# Patient Record
Sex: MALE | Race: WHITE | NOT HISPANIC OR LATINO | Employment: FULL TIME | URBAN - METROPOLITAN AREA
[De-identification: names, ages, dates, MRNs, and addresses within clinical notes are randomized per-mention and may not be internally consistent; named-entity substitution may affect disease eponyms.]

---

## 2020-02-07 ENCOUNTER — APPOINTMENT (OUTPATIENT)
Dept: GENERAL RADIOLOGY | Facility: HOSPITAL | Age: 65
End: 2020-02-07

## 2020-02-07 ENCOUNTER — TRANSCRIBE ORDERS (OUTPATIENT)
Dept: ADMINISTRATIVE | Facility: HOSPITAL | Age: 65
End: 2020-02-07

## 2020-02-07 ENCOUNTER — HOSPITAL ENCOUNTER (OUTPATIENT)
Facility: HOSPITAL | Age: 65
Setting detail: OBSERVATION
Discharge: HOME | End: 2020-02-09
Attending: EMERGENCY MEDICINE | Admitting: INTERNAL MEDICINE

## 2020-02-07 ENCOUNTER — HOSPITAL ENCOUNTER (OUTPATIENT)
Dept: GENERAL RADIOLOGY | Facility: HOSPITAL | Age: 65
Discharge: HOME | End: 2020-02-07
Admitting: FAMILY MEDICINE

## 2020-02-07 ENCOUNTER — LAB REQUISITION (OUTPATIENT)
Dept: LAB | Facility: HOSPITAL | Age: 65
End: 2020-02-07

## 2020-02-07 DIAGNOSIS — I50.9 NEW ONSET OF CONGESTIVE HEART FAILURE (HCC): Primary | ICD-10-CM

## 2020-02-07 DIAGNOSIS — R60.9 PERIPHERAL EDEMA: ICD-10-CM

## 2020-02-07 DIAGNOSIS — Z00.00 ROUTINE GENERAL MEDICAL EXAMINATION AT A HEALTH CARE FACILITY: ICD-10-CM

## 2020-02-07 DIAGNOSIS — R60.9 EDEMA, UNSPECIFIED: ICD-10-CM

## 2020-02-07 DIAGNOSIS — I50.21 ACUTE SYSTOLIC CONGESTIVE HEART FAILURE (HCC): ICD-10-CM

## 2020-02-07 DIAGNOSIS — R60.9 PERIPHERAL EDEMA: Primary | ICD-10-CM

## 2020-02-07 DIAGNOSIS — R06.02 SHORTNESS OF BREATH: ICD-10-CM

## 2020-02-07 DIAGNOSIS — R60.0 BILATERAL LOWER EXTREMITY EDEMA: ICD-10-CM

## 2020-02-07 PROBLEM — I10 HTN (HYPERTENSION): Status: ACTIVE | Noted: 2020-02-07

## 2020-02-07 PROBLEM — I48.91 A-FIB (HCC): Status: ACTIVE | Noted: 2020-02-07

## 2020-02-07 PROBLEM — F32.A DEPRESSION: Status: ACTIVE | Noted: 2020-02-07

## 2020-02-07 LAB
ALBUMIN SERPL-MCNC: 3.9 G/DL (ref 3.5–5.2)
ALBUMIN/GLOB SERPL: 1.7 G/DL
ALP SERPL-CCNC: 74 U/L (ref 39–117)
ALT SERPL W P-5'-P-CCNC: 80 U/L (ref 1–41)
ANION GAP SERPL CALCULATED.3IONS-SCNC: 13 MMOL/L (ref 5–15)
AST SERPL-CCNC: 49 U/L (ref 1–40)
BASOPHILS # BLD AUTO: 0.06 10*3/MM3 (ref 0–0.2)
BASOPHILS NFR BLD AUTO: 0.9 % (ref 0–1.5)
BILIRUB SERPL-MCNC: 0.4 MG/DL (ref 0.2–1.2)
BUN BLD-MCNC: 13 MG/DL (ref 8–23)
BUN/CREAT SERPL: 12.6 (ref 7–25)
CALCIUM SPEC-SCNC: 9.5 MG/DL (ref 8.6–10.5)
CHLORIDE SERPL-SCNC: 101 MMOL/L (ref 98–107)
CO2 SERPL-SCNC: 25 MMOL/L (ref 22–29)
CREAT BLD-MCNC: 1.03 MG/DL (ref 0.76–1.27)
DEPRECATED RDW RBC AUTO: 54.8 FL (ref 37–54)
EOSINOPHIL # BLD AUTO: 0.05 10*3/MM3 (ref 0–0.4)
EOSINOPHIL NFR BLD AUTO: 0.8 % (ref 0.3–6.2)
ERYTHROCYTE [DISTWIDTH] IN BLOOD BY AUTOMATED COUNT: 14.6 % (ref 12.3–15.4)
GFR SERPL CREATININE-BSD FRML MDRD: 73 ML/MIN/1.73
GLOBULIN UR ELPH-MCNC: 2.3 GM/DL
GLUCOSE BLD-MCNC: 86 MG/DL (ref 65–99)
HCT VFR BLD AUTO: 45.7 % (ref 37.5–51)
HGB BLD-MCNC: 15.1 G/DL (ref 13–17.7)
HOLD SPECIMEN: NORMAL
HOLD SPECIMEN: NORMAL
IMM GRANULOCYTES # BLD AUTO: 0.02 10*3/MM3 (ref 0–0.05)
IMM GRANULOCYTES NFR BLD AUTO: 0.3 % (ref 0–0.5)
LYMPHOCYTES # BLD AUTO: 0.94 10*3/MM3 (ref 0.7–3.1)
LYMPHOCYTES NFR BLD AUTO: 14.2 % (ref 19.6–45.3)
MCH RBC QN AUTO: 33.9 PG (ref 26.6–33)
MCHC RBC AUTO-ENTMCNC: 33 G/DL (ref 31.5–35.7)
MCV RBC AUTO: 102.7 FL (ref 79–97)
MONOCYTES # BLD AUTO: 0.81 10*3/MM3 (ref 0.1–0.9)
MONOCYTES NFR BLD AUTO: 12.2 % (ref 5–12)
NEUTROPHILS # BLD AUTO: 4.76 10*3/MM3 (ref 1.7–7)
NEUTROPHILS NFR BLD AUTO: 71.6 % (ref 42.7–76)
NRBC BLD AUTO-RTO: 0 /100 WBC (ref 0–0.2)
NT-PROBNP SERPL-MCNC: 7347 PG/ML (ref 5–900)
NT-PROBNP SERPL-MCNC: 7470 PG/ML (ref 5–900)
PLATELET # BLD AUTO: 206 10*3/MM3 (ref 140–450)
PMV BLD AUTO: 10.9 FL (ref 6–12)
POTASSIUM BLD-SCNC: 3.5 MMOL/L (ref 3.5–5.2)
PROT SERPL-MCNC: 6.2 G/DL (ref 6–8.5)
RBC # BLD AUTO: 4.45 10*6/MM3 (ref 4.14–5.8)
SODIUM BLD-SCNC: 139 MMOL/L (ref 136–145)
TROPONIN T SERPL-MCNC: 0.01 NG/ML (ref 0–0.03)
TROPONIN T SERPL-MCNC: <0.01 NG/ML (ref 0–0.03)
WBC NRBC COR # BLD: 6.64 10*3/MM3 (ref 3.4–10.8)
WHOLE BLOOD HOLD SPECIMEN: NORMAL
WHOLE BLOOD HOLD SPECIMEN: NORMAL

## 2020-02-07 PROCEDURE — 25010000002 ENOXAPARIN PER 10 MG: Performed by: NURSE PRACTITIONER

## 2020-02-07 PROCEDURE — 93005 ELECTROCARDIOGRAM TRACING: CPT

## 2020-02-07 PROCEDURE — 96374 THER/PROPH/DIAG INJ IV PUSH: CPT

## 2020-02-07 PROCEDURE — 71046 X-RAY EXAM CHEST 2 VIEWS: CPT

## 2020-02-07 PROCEDURE — 99220 PR INITIAL OBSERVATION CARE/DAY 70 MINUTES: CPT | Performed by: INTERNAL MEDICINE

## 2020-02-07 PROCEDURE — 25010000002 FUROSEMIDE PER 20 MG: Performed by: PHYSICIAN ASSISTANT

## 2020-02-07 PROCEDURE — 83880 ASSAY OF NATRIURETIC PEPTIDE: CPT | Performed by: EMERGENCY MEDICINE

## 2020-02-07 PROCEDURE — 84484 ASSAY OF TROPONIN QUANT: CPT | Performed by: EMERGENCY MEDICINE

## 2020-02-07 PROCEDURE — 85025 COMPLETE CBC W/AUTO DIFF WBC: CPT

## 2020-02-07 PROCEDURE — 99284 EMERGENCY DEPT VISIT MOD MDM: CPT

## 2020-02-07 PROCEDURE — 93005 ELECTROCARDIOGRAM TRACING: CPT | Performed by: EMERGENCY MEDICINE

## 2020-02-07 PROCEDURE — G0378 HOSPITAL OBSERVATION PER HR: HCPCS

## 2020-02-07 PROCEDURE — 84484 ASSAY OF TROPONIN QUANT: CPT | Performed by: NURSE PRACTITIONER

## 2020-02-07 PROCEDURE — 80053 COMPREHEN METABOLIC PANEL: CPT | Performed by: EMERGENCY MEDICINE

## 2020-02-07 PROCEDURE — 96372 THER/PROPH/DIAG INJ SC/IM: CPT

## 2020-02-07 PROCEDURE — 83880 ASSAY OF NATRIURETIC PEPTIDE: CPT

## 2020-02-07 RX ORDER — TRIAMTERENE AND HYDROCHLOROTHIAZIDE 37.5; 25 MG/1; MG/1
1 TABLET ORAL DAILY
COMMUNITY
End: 2020-02-09 | Stop reason: HOSPADM

## 2020-02-07 RX ORDER — TRIAMTERENE AND HYDROCHLOROTHIAZIDE 37.5; 25 MG/1; MG/1
1 TABLET ORAL DAILY
Status: DISCONTINUED | OUTPATIENT
Start: 2020-02-08 | End: 2020-02-08

## 2020-02-07 RX ORDER — DULOXETIN HYDROCHLORIDE 30 MG/1
30 CAPSULE, DELAYED RELEASE ORAL DAILY
Status: DISCONTINUED | OUTPATIENT
Start: 2020-02-08 | End: 2020-02-09 | Stop reason: HOSPADM

## 2020-02-07 RX ORDER — MULTIVIT WITH MINERALS/LUTEIN
250 TABLET ORAL DAILY
COMMUNITY

## 2020-02-07 RX ORDER — MULTIPLE VITAMINS W/ MINERALS TAB 9MG-400MCG
1 TAB ORAL DAILY
Status: DISCONTINUED | OUTPATIENT
Start: 2020-02-08 | End: 2020-02-09 | Stop reason: HOSPADM

## 2020-02-07 RX ORDER — FUROSEMIDE 10 MG/ML
40 INJECTION INTRAMUSCULAR; INTRAVENOUS ONCE
Status: COMPLETED | OUTPATIENT
Start: 2020-02-07 | End: 2020-02-07

## 2020-02-07 RX ORDER — SODIUM CHLORIDE 0.9 % (FLUSH) 0.9 %
10 SYRINGE (ML) INJECTION AS NEEDED
Status: DISCONTINUED | OUTPATIENT
Start: 2020-02-07 | End: 2020-02-09 | Stop reason: HOSPADM

## 2020-02-07 RX ORDER — FUROSEMIDE 10 MG/ML
40 INJECTION INTRAMUSCULAR; INTRAVENOUS ONCE
Status: COMPLETED | OUTPATIENT
Start: 2020-02-08 | End: 2020-02-08

## 2020-02-07 RX ORDER — LISINOPRIL 5 MG/1
5 TABLET ORAL DAILY
COMMUNITY
End: 2020-02-09 | Stop reason: HOSPADM

## 2020-02-07 RX ORDER — ASCORBIC ACID 500 MG
250 TABLET ORAL DAILY
Status: DISCONTINUED | OUTPATIENT
Start: 2020-02-08 | End: 2020-02-09 | Stop reason: HOSPADM

## 2020-02-07 RX ORDER — IBUPROFEN 800 MG/1
800 TABLET ORAL EVERY 6 HOURS PRN
COMMUNITY
End: 2020-02-09 | Stop reason: HOSPADM

## 2020-02-07 RX ORDER — SODIUM CHLORIDE 0.9 % (FLUSH) 0.9 %
10 SYRINGE (ML) INJECTION EVERY 12 HOURS SCHEDULED
Status: DISCONTINUED | OUTPATIENT
Start: 2020-02-07 | End: 2020-02-09 | Stop reason: HOSPADM

## 2020-02-07 RX ORDER — DULOXETIN HYDROCHLORIDE 30 MG/1
30 CAPSULE, DELAYED RELEASE ORAL DAILY
COMMUNITY

## 2020-02-07 RX ORDER — LISINOPRIL 5 MG/1
5 TABLET ORAL DAILY
Status: DISCONTINUED | OUTPATIENT
Start: 2020-02-08 | End: 2020-02-08

## 2020-02-07 RX ADMIN — FUROSEMIDE 40 MG: 10 INJECTION, SOLUTION INTRAMUSCULAR; INTRAVENOUS at 18:11

## 2020-02-07 RX ADMIN — ENOXAPARIN SODIUM 40 MG: 40 INJECTION SUBCUTANEOUS at 22:26

## 2020-02-07 RX ADMIN — SODIUM CHLORIDE, PRESERVATIVE FREE 10 ML: 5 INJECTION INTRAVENOUS at 22:26

## 2020-02-07 NOTE — ED PROVIDER NOTES
"Subjective   Hardeep James is a 64 year old male presenting to the ED today with complaints of lower extremity swelling and shortness of breath.   He reports for the past 2-3 weeks he has been experiencing shortness of breath when working. 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 Then, 4 days ago, he noticed bilateral lower extremities swelling. The swelling has persisted and continued to worsen so he presented to his primary care provider today. While at his primary care provider, his BNP level was check and was elevated over 7000 and he was told to come to the ED for further evaluation. Additional complaints include chest tightness and productive cough.  He reports a history of peripheral neuropathy, however denies a cardiac history.  Specifically, no hx of CHF.. He reports he has smoked cigars for the past 23 years. There are no other acute complaints at this time.  He has had no prior cardiac workups.        History provided by:  Patient  Leg Swelling   Location:  Bilateral lower extremities  Quality:  Swelling  Severity:  Moderate  Onset quality:  Gradual  Timing:  Constant  Progression:  Worsening  Chronicity:  New  Context:  Presented to PCP for leg swelling, elevated BNP detected, told to go to ED for further evaluation  Associated symptoms: chest pain (tightness), cough (improving), shortness of breath and wheezing    Associated symptoms: no abdominal pain, no fever, no headaches, no nausea, no rash and no vomiting    Cough:     Progression:  Improving      Review of Systems   Constitutional: Negative for chills and fever.   Respiratory: Positive for cough (improving), shortness of breath and wheezing.    Cardiovascular: Positive for chest pain (tightness).   Gastrointestinal: Negative for abdominal pain, nausea and vomiting.   Genitourinary: Negative for dysuria.   Musculoskeletal: Negative for back pain.        Positive for " bilateral lower extremity swelling.   Skin: Negative for color change and rash.   Allergic/Immunologic: Negative for immunocompromised state.   Neurological: Negative for light-headedness and headaches.   Hematological: Negative.    Psychiatric/Behavioral: Negative.    All other systems reviewed and are negative.      Past Medical History:   Diagnosis Date   • Depression    • Hypertension    • Peripheral neuropathy        No Known Allergies    History reviewed. No pertinent surgical history.    History reviewed. No pertinent family history.    Social History     Socioeconomic History   • Marital status:      Spouse name: Not on file   • Number of children: Not on file   • Years of education: Not on file   • Highest education level: Not on file   Tobacco Use   • Smoking status: Former Smoker     Last attempt to quit: 2020     Years since quittin.0   Substance and Sexual Activity   • Alcohol use: Yes     Comment: fireball on the weekends   • Drug use: Never   • Sexual activity: Defer         Objective   Physical Exam   Constitutional: He is oriented to person, place, and time. He appears well-developed and well-nourished. No distress.   HENT:   Head: Normocephalic and atraumatic.   Nose: Nose normal.   Eyes: Pupils are equal, round, and reactive to light. Conjunctivae are normal. No scleral icterus.   Neck: Normal range of motion. Neck supple.   Cardiovascular: Normal rate, normal heart sounds and intact distal pulses.   No murmur heard.  Irregular rhythm.   Pulmonary/Chest: Effort normal and breath sounds normal. No respiratory distress.   Abdominal: Soft. There is no tenderness.   Musculoskeletal: Normal range of motion. He exhibits edema.   2+ bilateral lower extremity edema.   Neurological: He is alert and oriented to person, place, and time.   Skin: Skin is warm and dry.   Psychiatric: He has a normal mood and affect. His behavior is normal.   Nursing note and vitals reviewed.      Procedures          ED Course    Pt's proBNP is 7470.  CXR shows cardiomegally with mild pulmonary vascular congestion. Troponin is normal.  EKG shows sinus rhythm with frequent PACs.  At times, he appears to have brief a-fib, but this may actually be a manifestation of the PACs.   Pt was given Lasix 40mg IV.  Given his bilateral LE edema and his shortness of breath, along with an elevated proBNP, I think he warrants admission for echocardiogram and further cardiac evaluation.  Will discuss with hospitalist for admission.   ED Course as of Feb 07 1913 Fri Feb 07, 2020 1904 Paged hospitalist. -HNB    [RP]      ED Course User Index  [RP] Neil Wadeeric SHAHID     Recent Results (from the past 24 hour(s))   proBNP    Collection Time: 02/07/20 12:15 PM   Result Value Ref Range    proBNP 7,347.0 (H) 5.0 - 900.0 pg/mL   Comprehensive Metabolic Panel    Collection Time: 02/07/20  5:06 PM   Result Value Ref Range    Glucose 86 65 - 99 mg/dL    BUN 13 8 - 23 mg/dL    Creatinine 1.03 0.76 - 1.27 mg/dL    Sodium 139 136 - 145 mmol/L    Potassium 3.5 3.5 - 5.2 mmol/L    Chloride 101 98 - 107 mmol/L    CO2 25.0 22.0 - 29.0 mmol/L    Calcium 9.5 8.6 - 10.5 mg/dL    Total Protein 6.2 6.0 - 8.5 g/dL    Albumin 3.90 3.50 - 5.20 g/dL    ALT (SGPT) 80 (H) 1 - 41 U/L    AST (SGOT) 49 (H) 1 - 40 U/L    Alkaline Phosphatase 74 39 - 117 U/L    Total Bilirubin 0.4 0.2 - 1.2 mg/dL    eGFR Non African Amer 73 >60 mL/min/1.73    Globulin 2.3 gm/dL    A/G Ratio 1.7 g/dL    BUN/Creatinine Ratio 12.6 7.0 - 25.0    Anion Gap 13.0 5.0 - 15.0 mmol/L   BNP    Collection Time: 02/07/20  5:06 PM   Result Value Ref Range    proBNP 7,470.0 (H) 5.0 - 900.0 pg/mL   Troponin    Collection Time: 02/07/20  5:06 PM   Result Value Ref Range    Troponin T <0.010 0.000 - 0.030 ng/mL   Light Blue Top    Collection Time: 02/07/20  5:06 PM   Result Value Ref Range    Extra Tube hold for add-on    Green Top (Gel)    Collection Time: 02/07/20  5:06 PM   Result Value Ref Range    Extra  "Tube Hold for add-ons.    Lavender Top    Collection Time: 02/07/20  5:06 PM   Result Value Ref Range    Extra Tube hold for add-on    Gold Top - SST    Collection Time: 02/07/20  5:06 PM   Result Value Ref Range    Extra Tube Hold for add-ons.    CBC Auto Differential    Collection Time: 02/07/20  5:06 PM   Result Value Ref Range    WBC 6.64 3.40 - 10.80 10*3/mm3    RBC 4.45 4.14 - 5.80 10*6/mm3    Hemoglobin 15.1 13.0 - 17.7 g/dL    Hematocrit 45.7 37.5 - 51.0 %    .7 (H) 79.0 - 97.0 fL    MCH 33.9 (H) 26.6 - 33.0 pg    MCHC 33.0 31.5 - 35.7 g/dL    RDW 14.6 12.3 - 15.4 %    RDW-SD 54.8 (H) 37.0 - 54.0 fl    MPV 10.9 6.0 - 12.0 fL    Platelets 206 140 - 450 10*3/mm3    Neutrophil % 71.6 42.7 - 76.0 %    Lymphocyte % 14.2 (L) 19.6 - 45.3 %    Monocyte % 12.2 (H) 5.0 - 12.0 %    Eosinophil % 0.8 0.3 - 6.2 %    Basophil % 0.9 0.0 - 1.5 %    Immature Grans % 0.3 0.0 - 0.5 %    Neutrophils, Absolute 4.76 1.70 - 7.00 10*3/mm3    Lymphocytes, Absolute 0.94 0.70 - 3.10 10*3/mm3    Monocytes, Absolute 0.81 0.10 - 0.90 10*3/mm3    Eosinophils, Absolute 0.05 0.00 - 0.40 10*3/mm3    Basophils, Absolute 0.06 0.00 - 0.20 10*3/mm3    Immature Grans, Absolute 0.02 0.00 - 0.05 10*3/mm3    nRBC 0.0 0.0 - 0.2 /100 WBC     Note: In addition to lab results from this visit, the labs listed above may include labs taken at another facility or during a different encounter within the last 24 hours. Please correlate lab times with ED admission and discharge times for further clarification of the services performed during this visit.    XR Chest 1 View    (Results Pending)     Vitals:    02/07/20 1647 02/07/20 1811 02/07/20 1812   BP: (!) 139/119 117/88 117/88   Pulse: 61 98 102   Resp: 16     Temp: 98.5 °F (36.9 °C)     SpO2: 95%  97%   Weight: 99.8 kg (220 lb)     Height: 185.4 cm (73\")       Medications   sodium chloride 0.9 % flush 10 mL (has no administration in time range)   furosemide (LASIX) injection 40 mg (40 mg Intravenous " Given 2/7/20 1811)     ECG/EMG Results (last 24 hours)     Procedure Component Value Units Date/Time    ECG 12 Lead [[id number]] Collected:  02/07/20 1707     Updated:  02/07/20 1706        ECG 12 Lead                             [place] Coma Scale Score: 15                            MDM    Final diagnoses:   New onset of congestive heart failure (CMS/HCC)   Shortness of breath   Bilateral lower extremity edema       Documentation assistance provided by christie Wade.  Information recorded by the scribe was done at my direction and has been verified and validated by me.     Brenda Wade  02/07/20 1806       Zhen Zeng, PA  02/07/20 1919

## 2020-02-08 ENCOUNTER — APPOINTMENT (OUTPATIENT)
Dept: CARDIOLOGY | Facility: HOSPITAL | Age: 65
End: 2020-02-08

## 2020-02-08 LAB
ANION GAP SERPL CALCULATED.3IONS-SCNC: 14 MMOL/L (ref 5–15)
BH CV ECHO MEAS - AO MAX PG (FULL): 0.7 MMHG
BH CV ECHO MEAS - AO MAX PG: 4.7 MMHG
BH CV ECHO MEAS - AO MEAN PG (FULL): 0.25 MMHG
BH CV ECHO MEAS - AO MEAN PG: 2.1 MMHG
BH CV ECHO MEAS - AO ROOT AREA: 19.4 CM^2
BH CV ECHO MEAS - AO V2 MAX: 108.6 CM/SEC
BH CV ECHO MEAS - AO V2 MEAN: 64.1 CM/SEC
BH CV ECHO MEAS - AO V2 VTI: 14 CM
BH CV ECHO MEAS - AVA(I,A): 4.9 CM^2
BH CV ECHO MEAS - AVA(I,D): 4.9 CM^2
BH CV ECHO MEAS - AVA(V,A): 4.1 CM^2
BH CV ECHO MEAS - AVA(V,D): 4.1 CM^2
BH CV ECHO MEAS - BSA(HAYCOCK): 2.3 M^2
BH CV ECHO MEAS - BSA: 2.3 M^2
BH CV ECHO MEAS - BZI_BMI: 29.4 KILOGRAMS/M^2
BH CV ECHO MEAS - BZI_METRIC_HEIGHT: 185.4 CM
BH CV ECHO MEAS - BZI_METRIC_WEIGHT: 101.2 KG
BH CV ECHO MEAS - EDV(CUBED): 155.5 ML
BH CV ECHO MEAS - EDV(MOD-SP2): 150 ML
BH CV ECHO MEAS - EDV(MOD-SP4): 147 ML
BH CV ECHO MEAS - EDV(TEICH): 139.9 ML
BH CV ECHO MEAS - EF(CUBED): 44.8 %
BH CV ECHO MEAS - EF(MOD-BP): 39 %
BH CV ECHO MEAS - EF(MOD-SP2): 37.3 %
BH CV ECHO MEAS - EF(MOD-SP4): 37.4 %
BH CV ECHO MEAS - EF(TEICH): 37 %
BH CV ECHO MEAS - ESV(CUBED): 85.8 ML
BH CV ECHO MEAS - ESV(MOD-SP2): 94 ML
BH CV ECHO MEAS - ESV(MOD-SP4): 92 ML
BH CV ECHO MEAS - ESV(TEICH): 88.2 ML
BH CV ECHO MEAS - FS: 18 %
BH CV ECHO MEAS - IVS/LVPW: 1.2
BH CV ECHO MEAS - IVSD: 1.5 CM
BH CV ECHO MEAS - LA DIMENSION: 4.8 CM
BH CV ECHO MEAS - LA/AO: 0.97
BH CV ECHO MEAS - LAD MAJOR: 7.5 CM
BH CV ECHO MEAS - LAT PEAK E' VEL: 8.3 CM/SEC
BH CV ECHO MEAS - LATERAL E/E' RATIO: 12
BH CV ECHO MEAS - LV DIASTOLIC VOL/BSA (35-75): 65.2 ML/M^2
BH CV ECHO MEAS - LV MASS(C)D: 311.3 GRAMS
BH CV ECHO MEAS - LV MASS(C)DI: 138.1 GRAMS/M^2
BH CV ECHO MEAS - LV MAX PG: 4 MMHG
BH CV ECHO MEAS - LV MEAN PG: 1.9 MMHG
BH CV ECHO MEAS - LV SYSTOLIC VOL/BSA (12-30): 40.8 ML/M^2
BH CV ECHO MEAS - LV V1 MAX: 100.2 CM/SEC
BH CV ECHO MEAS - LV V1 MEAN: 62.5 CM/SEC
BH CV ECHO MEAS - LV V1 VTI: 15.4 CM
BH CV ECHO MEAS - LVIDD: 5.4 CM
BH CV ECHO MEAS - LVIDS: 4.4 CM
BH CV ECHO MEAS - LVLD AP2: 10.1 CM
BH CV ECHO MEAS - LVLD AP4: 10.8 CM
BH CV ECHO MEAS - LVLS AP2: 9.4 CM
BH CV ECHO MEAS - LVLS AP4: 9.6 CM
BH CV ECHO MEAS - LVOT AREA (M): 4.5 CM^2
BH CV ECHO MEAS - LVOT AREA: 4.5 CM^2
BH CV ECHO MEAS - LVOT DIAM: 2.4 CM
BH CV ECHO MEAS - LVPWD: 1.3 CM
BH CV ECHO MEAS - MED PEAK E' VEL: 4.2 CM/SEC
BH CV ECHO MEAS - MEDIAL E/E' RATIO: 23.6
BH CV ECHO MEAS - MV A MAX VEL: 37.5 CM/SEC
BH CV ECHO MEAS - MV DEC TIME: 0.15 SEC
BH CV ECHO MEAS - MV E MAX VEL: 101.2 CM/SEC
BH CV ECHO MEAS - MV E/A: 2.7
BH CV ECHO MEAS - MV MAX PG: 4.9 MMHG
BH CV ECHO MEAS - MV MEAN PG: 1.5 MMHG
BH CV ECHO MEAS - MV V2 MAX: 110.9 CM/SEC
BH CV ECHO MEAS - MV V2 MEAN: 55.2 CM/SEC
BH CV ECHO MEAS - MV V2 VTI: 28.2 CM
BH CV ECHO MEAS - MVA(VTI): 2.4 CM^2
BH CV ECHO MEAS - PA ACC SLOPE: 619.8 CM/SEC^2
BH CV ECHO MEAS - PA ACC TIME: 0.13 SEC
BH CV ECHO MEAS - PA MAX PG: 1.7 MMHG
BH CV ECHO MEAS - PA PR(ACCEL): 18.8 MMHG
BH CV ECHO MEAS - PA V2 MAX: 65.7 CM/SEC
BH CV ECHO MEAS - RAP SYSTOLE: 8 MMHG
BH CV ECHO MEAS - RVSP: 44 MMHG
BH CV ECHO MEAS - SI(AO): 120.5 ML/M^2
BH CV ECHO MEAS - SI(CUBED): 30.9 ML/M^2
BH CV ECHO MEAS - SI(LVOT): 30.4 ML/M^2
BH CV ECHO MEAS - SI(MOD-SP2): 24.8 ML/M^2
BH CV ECHO MEAS - SI(MOD-SP4): 24.4 ML/M^2
BH CV ECHO MEAS - SI(TEICH): 23 ML/M^2
BH CV ECHO MEAS - SV(AO): 271.6 ML
BH CV ECHO MEAS - SV(CUBED): 69.6 ML
BH CV ECHO MEAS - SV(LVOT): 68.6 ML
BH CV ECHO MEAS - SV(MOD-SP2): 56 ML
BH CV ECHO MEAS - SV(MOD-SP4): 55 ML
BH CV ECHO MEAS - SV(TEICH): 51.7 ML
BH CV ECHO MEAS - TAPSE (>1.6): 2 CM2
BH CV ECHO MEAS - TR MAX PG: 36 MMHG
BH CV ECHO MEAS - TR MAX VEL: 304 CM/SEC
BH CV ECHO MEASUREMENTS AVERAGE E/E' RATIO: 16.19
BH CV VAS BP RIGHT ARM: NORMAL MMHG
BH CV XLRA - RV BASE: 5.2 CM
BH CV XLRA - RV LENGTH: 7.8 CM
BH CV XLRA - RV MID: 3 CM
BH CV XLRA - TDI S': 8.22 CM/SEC
BUN BLD-MCNC: 13 MG/DL (ref 8–23)
BUN/CREAT SERPL: 14.8 (ref 7–25)
CALCIUM SPEC-SCNC: 8.7 MG/DL (ref 8.6–10.5)
CHLORIDE SERPL-SCNC: 100 MMOL/L (ref 98–107)
CHOLEST SERPL-MCNC: 123 MG/DL (ref 0–200)
CO2 SERPL-SCNC: 23 MMOL/L (ref 22–29)
CREAT BLD-MCNC: 0.88 MG/DL (ref 0.76–1.27)
DEPRECATED RDW RBC AUTO: 54.5 FL (ref 37–54)
ERYTHROCYTE [DISTWIDTH] IN BLOOD BY AUTOMATED COUNT: 14.6 % (ref 12.3–15.4)
GFR SERPL CREATININE-BSD FRML MDRD: 88 ML/MIN/1.73
GLUCOSE BLD-MCNC: 110 MG/DL (ref 65–99)
HBA1C MFR BLD: 5.7 % (ref 4.8–5.6)
HCT VFR BLD AUTO: 43 % (ref 37.5–51)
HDLC SERPL-MCNC: 49 MG/DL (ref 40–60)
HGB BLD-MCNC: 14.1 G/DL (ref 13–17.7)
LDLC SERPL CALC-MCNC: 61 MG/DL (ref 0–100)
LDLC/HDLC SERPL: 1.24 {RATIO}
LEFT ATRIUM VOLUME INDEX: 42.2 ML/M^2
LEFT ATRIUM VOLUME: 95 ML
MAXIMAL PREDICTED HEART RATE: 158 BPM
MCH RBC QN AUTO: 33.3 PG (ref 26.6–33)
MCHC RBC AUTO-ENTMCNC: 32.8 G/DL (ref 31.5–35.7)
MCV RBC AUTO: 101.7 FL (ref 79–97)
PLATELET # BLD AUTO: 177 10*3/MM3 (ref 140–450)
PMV BLD AUTO: 11.2 FL (ref 6–12)
POTASSIUM BLD-SCNC: 3.4 MMOL/L (ref 3.5–5.2)
POTASSIUM BLD-SCNC: 3.7 MMOL/L (ref 3.5–5.2)
RBC # BLD AUTO: 4.23 10*6/MM3 (ref 4.14–5.8)
SINUS: 5 CM
SODIUM BLD-SCNC: 137 MMOL/L (ref 136–145)
STRESS TARGET HR: 134 BPM
TRIGL SERPL-MCNC: 66 MG/DL (ref 0–150)
TROPONIN T SERPL-MCNC: 0.01 NG/ML (ref 0–0.03)
TSH SERPL DL<=0.05 MIU/L-ACNC: 2.09 UIU/ML (ref 0.27–4.2)
VLDLC SERPL-MCNC: 13.2 MG/DL
WBC NRBC COR # BLD: 6.72 10*3/MM3 (ref 3.4–10.8)

## 2020-02-08 PROCEDURE — 25010000002 SULFUR HEXAFLUORIDE MICROSPH 60.7-25 MG RECONSTITUTED SUSPENSION: Performed by: NURSE PRACTITIONER

## 2020-02-08 PROCEDURE — 96372 THER/PROPH/DIAG INJ SC/IM: CPT

## 2020-02-08 PROCEDURE — G0378 HOSPITAL OBSERVATION PER HR: HCPCS

## 2020-02-08 PROCEDURE — 25010000002 FUROSEMIDE PER 20 MG: Performed by: NURSE PRACTITIONER

## 2020-02-08 PROCEDURE — 80061 LIPID PANEL: CPT | Performed by: NURSE PRACTITIONER

## 2020-02-08 PROCEDURE — 93306 TTE W/DOPPLER COMPLETE: CPT | Performed by: INTERNAL MEDICINE

## 2020-02-08 PROCEDURE — 83036 HEMOGLOBIN GLYCOSYLATED A1C: CPT | Performed by: NURSE PRACTITIONER

## 2020-02-08 PROCEDURE — 93306 TTE W/DOPPLER COMPLETE: CPT

## 2020-02-08 PROCEDURE — 84132 ASSAY OF SERUM POTASSIUM: CPT | Performed by: INTERNAL MEDICINE

## 2020-02-08 PROCEDURE — 25010000002 ENOXAPARIN PER 10 MG: Performed by: NURSE PRACTITIONER

## 2020-02-08 PROCEDURE — 84484 ASSAY OF TROPONIN QUANT: CPT | Performed by: INTERNAL MEDICINE

## 2020-02-08 PROCEDURE — 93005 ELECTROCARDIOGRAM TRACING: CPT | Performed by: NURSE PRACTITIONER

## 2020-02-08 PROCEDURE — 84443 ASSAY THYROID STIM HORMONE: CPT | Performed by: NURSE PRACTITIONER

## 2020-02-08 PROCEDURE — 99226 PR SBSQ OBSERVATION CARE/DAY 35 MINUTES: CPT | Performed by: INTERNAL MEDICINE

## 2020-02-08 PROCEDURE — 80048 BASIC METABOLIC PNL TOTAL CA: CPT | Performed by: NURSE PRACTITIONER

## 2020-02-08 PROCEDURE — 85027 COMPLETE CBC AUTOMATED: CPT | Performed by: NURSE PRACTITIONER

## 2020-02-08 PROCEDURE — 99245 OFF/OP CONSLTJ NEW/EST HI 55: CPT | Performed by: INTERNAL MEDICINE

## 2020-02-08 PROCEDURE — 96376 TX/PRO/DX INJ SAME DRUG ADON: CPT

## 2020-02-08 RX ORDER — POTASSIUM CHLORIDE 750 MG/1
40 CAPSULE, EXTENDED RELEASE ORAL AS NEEDED
Status: DISCONTINUED | OUTPATIENT
Start: 2020-02-08 | End: 2020-02-09 | Stop reason: HOSPADM

## 2020-02-08 RX ORDER — FUROSEMIDE 10 MG/ML
40 INJECTION INTRAMUSCULAR; INTRAVENOUS
Status: DISCONTINUED | OUTPATIENT
Start: 2020-02-08 | End: 2020-02-09

## 2020-02-08 RX ORDER — CARVEDILOL 12.5 MG/1
12.5 TABLET ORAL 2 TIMES DAILY WITH MEALS
Status: DISCONTINUED | OUTPATIENT
Start: 2020-02-08 | End: 2020-02-09 | Stop reason: HOSPADM

## 2020-02-08 RX ORDER — FAMOTIDINE 20 MG/1
20 TABLET, FILM COATED ORAL 2 TIMES DAILY PRN
Status: DISCONTINUED | OUTPATIENT
Start: 2020-02-08 | End: 2020-02-09 | Stop reason: HOSPADM

## 2020-02-08 RX ORDER — POTASSIUM CHLORIDE 1.5 G/1.77G
40 POWDER, FOR SOLUTION ORAL AS NEEDED
Status: DISCONTINUED | OUTPATIENT
Start: 2020-02-08 | End: 2020-02-09 | Stop reason: HOSPADM

## 2020-02-08 RX ORDER — CHOLECALCIFEROL (VITAMIN D3) 125 MCG
5 CAPSULE ORAL NIGHTLY PRN
Status: DISCONTINUED | OUTPATIENT
Start: 2020-02-08 | End: 2020-02-09 | Stop reason: HOSPADM

## 2020-02-08 RX ORDER — POTASSIUM CHLORIDE 7.45 MG/ML
10 INJECTION INTRAVENOUS
Status: DISCONTINUED | OUTPATIENT
Start: 2020-02-08 | End: 2020-02-09 | Stop reason: HOSPADM

## 2020-02-08 RX ORDER — LISINOPRIL 10 MG/1
10 TABLET ORAL DAILY
Status: DISCONTINUED | OUTPATIENT
Start: 2020-02-08 | End: 2020-02-08

## 2020-02-08 RX ORDER — ONDANSETRON 2 MG/ML
4 INJECTION INTRAMUSCULAR; INTRAVENOUS EVERY 6 HOURS PRN
Status: DISCONTINUED | OUTPATIENT
Start: 2020-02-08 | End: 2020-02-09 | Stop reason: HOSPADM

## 2020-02-08 RX ORDER — ACETAMINOPHEN 325 MG/1
650 TABLET ORAL EVERY 6 HOURS PRN
Status: DISCONTINUED | OUTPATIENT
Start: 2020-02-08 | End: 2020-02-09 | Stop reason: HOSPADM

## 2020-02-08 RX ADMIN — DULOXETINE HYDROCHLORIDE 30 MG: 30 CAPSULE, DELAYED RELEASE ORAL at 08:11

## 2020-02-08 RX ADMIN — SULFUR HEXAFLUORIDE 2 ML: KIT at 17:00

## 2020-02-08 RX ADMIN — OXYCODONE HYDROCHLORIDE AND ACETAMINOPHEN 250 MG: 500 TABLET ORAL at 08:10

## 2020-02-08 RX ADMIN — LISINOPRIL 10 MG: 10 TABLET ORAL at 08:11

## 2020-02-08 RX ADMIN — SODIUM CHLORIDE, PRESERVATIVE FREE 10 ML: 5 INJECTION INTRAVENOUS at 21:11

## 2020-02-08 RX ADMIN — POTASSIUM CHLORIDE 40 MEQ: 750 CAPSULE, EXTENDED RELEASE ORAL at 06:45

## 2020-02-08 RX ADMIN — TRIAMTERENE AND HYDROCHLOROTHIAZIDE 1 TABLET: 37.5; 25 TABLET ORAL at 08:10

## 2020-02-08 RX ADMIN — SODIUM CHLORIDE, PRESERVATIVE FREE 10 ML: 5 INJECTION INTRAVENOUS at 08:11

## 2020-02-08 RX ADMIN — ENOXAPARIN SODIUM 40 MG: 40 INJECTION SUBCUTANEOUS at 21:11

## 2020-02-08 RX ADMIN — CARVEDILOL 12.5 MG: 12.5 TABLET, FILM COATED ORAL at 21:13

## 2020-02-08 RX ADMIN — VITAMIN E CAP 100 UNIT 100 UNITS: 100 CAP at 08:10

## 2020-02-08 RX ADMIN — FUROSEMIDE 40 MG: 10 INJECTION, SOLUTION INTRAMUSCULAR; INTRAVENOUS at 06:45

## 2020-02-08 RX ADMIN — FUROSEMIDE 40 MG: 10 INJECTION, SOLUTION INTRAMUSCULAR; INTRAVENOUS at 17:01

## 2020-02-08 RX ADMIN — MULTIPLE VITAMINS W/ MINERALS TAB 1 TABLET: TAB ORAL at 08:10

## 2020-02-08 RX ADMIN — CARVEDILOL 12.5 MG: 12.5 TABLET, FILM COATED ORAL at 11:36

## 2020-02-08 NOTE — PLAN OF CARE
Problem: Patient Care Overview  Goal: Plan of Care Review  2/8/2020 0345 by Alecia Peoples, RN  Outcome: Ongoing (interventions implemented as appropriate)  Flowsheets (Taken 2/8/2020 0342)  Progress: no change  Plan of Care Reviewed With: patient  Outcome Summary: Pt admitted last night with acute heart failure. IV lasix given with good urine output. Pt had no complaints last night. Cardiology to see today. Will continue to monitor.  2/8/2020 0342 by Alecia Peoples, RN  Flowsheets (Taken 2/8/2020 0342)  Progress: no change  Plan of Care Reviewed With: patient  Outcome Summary: Pt admitted last night with acute heart failure. IV lasix given with good urine output. Pt had no complaints last night. Cardiology to see today. Will continue to monitor.     Problem: Cardiac: Heart Failure (Adult)  Goal: Signs and Symptoms of Listed Potential Problems Will be Absent, Minimized or Managed (Cardiac: Heart Failure)  Outcome: Ongoing (interventions implemented as appropriate)  Flowsheets (Taken 2/8/2020 0342)  Problems Assessed (Heart Failure): decreased quality of life; fluid/electrolyte imbalance; respiratory compromise; situational response; sleep-disordered breathing  Problems Present (Heart Failure): sleep-disordered breathing; fluid/electrolyte imbalance; respiratory compromise; decreased quality of life; situational response

## 2020-02-08 NOTE — PROGRESS NOTES
[facility] Medicine Services  PROGRESS NOTE    Patient Name: Hardeep James  : 1955  MRN: [id number]    Date of Admission: 2020  Primary Care Physician: Mónica Anthony MD    Subjective   Subjective     CC:  Follow-up congestive heart failure    HPI:  Notes that his shortness of breath is much improved today.  Still has significant leg swelling and notes he is up greater than 10 pounds.  Notes recent chest pressure prior to admission but none this morning.  No other new complaints.    Review of Systems  No current fevers or chills  No current shortness of breath or cough  No current nausea, vomiting, or diarrhea  No current dysuria or hematuria    Objective   Objective     Vital Signs:   Temp:  [98 °F (36.7 °C)-98.5 °F (36.9 °C)] 98 °F (36.7 °C)  Heart Rate:  [] 102  Resp:  [16-20] 20  BP: (112-139)/() 113/97        Physical Exam:  Constitutional:Awake, alert  HENT: NCAT, mucous membranes moist, neck supple  Respiratory: Rales at the bases bilaterally, more clear at the apex, perhaps borderline tachypnea  Cardiovascular: RRR, normal radial pulses  Gastrointestinal: Positive bowel sounds, soft, nontender, nondistended  Musculoskeletal: Normal musculature for age, pitting bilateral lower extremity edema, BMI 29  Psychiatric: Appropriate affect, cooperative, conversational  Neurologic: No slurred speech or facial droop, follows commands, not confused   Skin: No rashes or jaundice, warm      Results Reviewed:  Results from last 7 days   Lab Units 20  04320  1706   WBC 10*3/mm3 6.72 6.64   HEMOGLOBIN g/dL 14.1 15.1   HEMATOCRIT % 43.0 45.7   PLATELETS 10*3/mm3 177 206     Results from last 7 days   Lab Units 20  0431 20  2246 20  1706 20  1215   SODIUM mmol/L 137  --  139  --    POTASSIUM mmol/L 3.4*  --  3.5  --    CHLORIDE mmol/L 100  --  101  --    CO2 mmol/L 23.0  --  25.0  --    BUN mg/dL 13  --  13  --    CREATININE mg/dL 0.88   --  1.03  --    GLUCOSE mg/dL 110*  --  86  --    CALCIUM mg/dL 8.7  --  9.5  --    ALT (SGPT) U/L  --   --  80*  --    AST (SGOT) U/L  --   --  49*  --    TROPONIN T ng/mL  --  0.014 <0.010  --    PROBNP pg/mL  --   --  7,470.0* 7,347.0*     Estimated Creatinine Clearance: 108.7 mL/min (by C-G formula based on SCr of 0.88 mg/dL).    Microbiology Results Abnormal     None          Imaging Results (Last 24 Hours)     ** No results found for the last 24 hours. **               I have reviewed the medications:  Scheduled Meds:  DULoxetine 30 mg Oral Daily   enoxaparin 40 mg Subcutaneous Q24H   furosemide 40 mg Intravenous Once   lisinopril 10 mg Oral Daily   multivitamin with minerals 1 tablet Oral Daily   sodium chloride 10 mL Intravenous Q12H   triamterene-hydrochlorothiazide 1 tablet Oral Daily   vitamin C 250 mg Oral Daily   vitamin E 100 Units Oral Daily     Continuous Infusions:   PRN Meds:.•  acetaminophen  •  famotidine  •  melatonin  •  ondansetron  •  potassium chloride **OR** potassium chloride **OR** potassium chloride  •  sodium chloride  •  sodium chloride    Assessment/Plan   Assessment & Plan     Active Hospital Problems    Diagnosis  POA   • **Acute congestive heart failure (CMS/HCC) [I50.9]  Yes   • A-fib (CMS/HCC) [I48.91]  Yes   • HTN (hypertension) [I10]  Yes   • Depression [F32.9]  Yes   • New onset of congestive heart failure (CMS/HCC) [I50.9]  Yes      Resolved Hospital Problems   No resolved problems to display.        Brief Hospital Course to date:  Hardeep James is a 64 y.o. male with past medical history of hypertension presents the hospital with new onset congestive heart failure with pitting peripheral edema, pulmonary edema on chest x-ray, and tachycardia which emergency department reported was rapid episode of atrial fibrillation.    Plan: N.p.o. for possible stress test today.  Follow-up echocardiogram.  Follow-up cardiology recommendations.  Follow response to morning dose of Lasix  and consider further IV Lasix later today.  Recheck morning troponin.  Replace potassium.  Overall clinically improving.  Recheck laboratory studies tomorrow.    Acute congestive heart failure  ED reported episode of atrial fibrillation, resolved  Sinus tachycardia with frequent PACs, resolved  Chest x-ray images reviewed pulmonary edema noted.  Initially hypoxic in the ER but improved with Lasix  Echocardiogram pending  Cardiology consult team to evaluate  Continue lisinopril, Maxide added to computer list of home medications but patient denies actively taking this medicine, trying to get further data    Chest pressure with exertion:  Troponin negative  EKG without ST elevation  Defer further testing to cardiology for now however may benefit from stress test today    Depression: Stable.  Continue home medication, no SI    Essential hypertension: Currently on lisinopril and Maxide ordered    DVT Prophylaxis:   LVX    Disposition: I expect the patient to be discharged home when better.    CODE STATUS:   Code Status and Medical Interventions:   Ordered at: 02/07/20 2051     Level Of Support Discussed With:    Patient     Code Status:    CPR     Medical Interventions (Level of Support Prior to Arrest):    Full         Electronically signed by Jaylan Pollard MD, 02/08/20, 6:45 AM.

## 2020-02-08 NOTE — H&P
[facility] Medicine Services  HISTORY AND PHYSICAL    Patient Name: Hardeep James  : 1955  MRN: [id number]  Primary Care Physician: Mónica Anthony MD  Date of admission: 2020      Subjective   Subjective     Chief Complaint:  Shortness of breath and lower extremity swelling    HPI:  Hardeep James is a 64 y.o. male with a past medical history of depression, hypertension, peripheral neuropathy, presents to the ED with complaints of shortness of breath and bilateral lower extremity swelling.  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  His states that he is unable to lay down flat due to increase shortness of breath.  Over the last 4 days he has developed bilateral lower extremity swelling, which prompted him to see his PCP today.  Labs drawn at PCPs office showed a BNP over 7000 at which time he was referred to the ED. He also reports abdominal distention, chest tightness, but denies fever, chest pain, cough, nausea, vomiting, abdominal pain, diarrhea, or any other complaints at this time.  Chest x-ray shows cardiomegaly with mild pulmonary vascular congestion.  EKG shows sinus rhythm with frequent PACs.  Patient was given 40 of Lasix IV x1 dose in the emergency department, and is being admitted to the hospital medicine service for further evaluation and management.    Review of Systems   Constitutional: Negative.    HENT: Negative.    Eyes: Negative.    Respiratory: Positive for chest tightness and shortness of breath. Negative for cough and wheezing.    Cardiovascular: Positive for leg swelling. Negative for chest pain and palpitations.   Gastrointestinal: Positive for abdominal distention. Negative for abdominal pain, blood in stool, constipation, diarrhea, nausea and vomiting.   Endocrine: Negative.    Genitourinary: Negative.       Musculoskeletal: Negative.    Skin: Negative.    Allergic/Immunologic: Negative.    Neurological: Negative.    Hematological: Negative.    Psychiatric/Behavioral: Negative.           Personal History     Past Medical History:   Diagnosis Date   • Depression    • Hypertension    • Peripheral neuropathy        History reviewed. No pertinent surgical history.    Family History: family history is not on file. Otherwise pertinent FHx was reviewed and unremarkable.     Social History:  reports that he quit smoking about 3 weeks ago. He does not have any smokeless tobacco history on file. He reports that he drinks alcohol. He reports that he does not use drugs.  Social History     Social History Narrative   • Not on file       Medications:  Available home medication information reviewed.  Medications Prior to Admission   Medication Sig Dispense Refill Last Dose   • DULoxetine (CYMBALTA) 30 MG capsule Take 30 mg by mouth Daily.   2/7/2020 at Unknown time   • ibuprofen (ADVIL,MOTRIN) 800 MG tablet Take 800 mg by mouth Every 6 (Six) Hours As Needed for Mild Pain .   Past Week at Unknown time   • lisinopril (PRINIVIL,ZESTRIL) 5 MG tablet Take 5 mg by mouth Daily.   2/7/2020 at Unknown time   • Multiple Vitamins-Minerals (CENTRUM ADULTS PO) Take 1 tablet by mouth Daily.   Past Week at Unknown time   • triamterene-hydrochlorothiazide (MAXZIDE-25) 37.5-25 MG per tablet Take 1 tablet by mouth Daily.   2/7/2020 at Unknown time   • vitamin C (ASCORBIC ACID) 250 MG tablet Take 250 mg by mouth Daily.   Past Week at Unknown time   • vitamin E 100 UNIT capsule Take 100 Units by mouth Daily.   Past Week at Unknown time       No Known Allergies    Objective   Objective     Vital Signs:   Temp:  [98.5 °F (36.9 °C)] 98.5 °F (36.9 °C)  Heart Rate:  [] 102  Resp:  [16] 16  BP: (117-139)/() 117/88        Physical Exam   Constitutional: He is oriented to person, place, and time. He appears well-developed. No distress.   HENT:    Head: Normocephalic.   Eyes: Pupils are equal, round, and reactive to light.   Neck: Normal range of motion. Neck supple.   Cardiovascular: Normal heart sounds and intact distal pulses. Exam reveals no gallop and no friction rub.   No murmur heard.  Tachycardia, irregular   Pulmonary/Chest: Effort normal. No stridor. No respiratory distress. He has no wheezes. He has rales (bilateral bases).   Abdominal: Soft. Bowel sounds are normal. He exhibits no distension and no mass. There is no tenderness. There is no rebound and no guarding.   Musculoskeletal: Normal range of motion. He exhibits edema (2+ BLE). He exhibits no tenderness or deformity.   Neurological: He is alert and oriented to person, place, and time. No cranial nerve deficit.   Skin: Skin is warm and dry. No rash noted. He is not diaphoretic. No erythema. No pallor.   Psychiatric: He has a normal mood and affect. His behavior is normal. Judgment and thought content normal.          Results Reviewed:  I have personally reviewed current lab and radiology data.    Results from last 7 days   Lab Units 02/07/20  1706   WBC 10*3/mm3 6.64   HEMOGLOBIN g/dL 15.1   HEMATOCRIT % 45.7   PLATELETS 10*3/mm3 206     Results from last 7 days   Lab Units 02/07/20  1706   SODIUM mmol/L 139   POTASSIUM mmol/L 3.5   CHLORIDE mmol/L 101   CO2 mmol/L 25.0   BUN mg/dL 13   CREATININE mg/dL 1.03   GLUCOSE mg/dL 86   CALCIUM mg/dL 9.5   ALT (SGPT) U/L 80*   AST (SGOT) U/L 49*   TROPONIN T ng/mL <0.010   PROBNP pg/mL 7,470.0*     Estimated Creatinine Clearance: 92.5 mL/min (by C-G formula based on SCr of 1.03 mg/dL).  Brief Urine Lab Results     None        Imaging Results (Last 24 Hours)     Procedure Component Value Units Date/Time    XR Chest 1 View [[id number]] Resulted:  02/07/20 1734     Updated:  02/07/20 1737             Assessment/Plan   Assessment & Plan     Active Hospital Problems    Diagnosis POA   • **Acute congestive heart failure (CMS/HCC) [I50.9] Yes   • A-fib  (CMS/[facility]) [I48.91] Yes   • HTN (hypertension) [I10] Yes   • Depression [F32.9] Yes     Hardeep James is a 64 y.o. male with a past medical history of depression, hypertension, peripheral neuropathy, presents to the ED with complaints of shortness of breath and bilateral lower extremity swelling.  Chest x-ray shows cardiomegaly with mild pulmonary vascular congestion.  EKG shows sinus rhythm with frequent PACs.  ProBNP 7470.  Patient was given 40 of Lasix IV x1 dose in the emergency department.    Assessment and Plan:    Acute CHF  ST with frequent PAC--ED reported episode of afib  --chest xray shows cardiomegally with mild pulmonary vascular congestion.  Required oxygen initially in the ER.  He also reports chest pressure with exertion which is concerning for underlying cardiac ischemia.  --was given Lasix in the ED  --will give Lasix 40 mg IV x 1 dose in the ED and defer further diuresis to cardiology  --echo in the am  --consult cardiology in the am.    --strict I&O's  --daily weight  --continue maxzide and lisinopril  --trend troponin  --EKG in the am  --cbc, bmp, flp, tsh in the am    Chest pressure with exertion  -Patient notes this over the past 2 to 3 weeks at work.  No current chest pain  -Troponin x1 negative  -EKG reviewed, right bundle branch block.  No prior EKGs to compare to.  No changes concerning for ischemia    HTN  --continue lisinopril and maxzide    Depression  --continue cymbalta    DVT prophylaxis:  Lovenox    CODE STATUS:    Code Status and Medical Interventions:   Ordered at: 02/07/20 2051     Level Of Support Discussed With:    Patient     Code Status:    CPR     Medical Interventions (Level of Support Prior to Arrest):    Full       Admission Status:  I believe this patient meets INPATIENT status due to heart failure in the setting of chest pressure, concern for systolic heart failure, may require ischemic work-up.  I feel patient’s risk for adverse outcomes and need for care warrant  INPATIENT evaluation and I predict the patient’s care encounter to likely last beyond 2 midnights.      Electronically signed by DAVID Hill, 02/07/20, 7:53 PM.      Attending   Admission Attestation       I have seen and examined the patient, performing an independent face-to-face diagnostic evaluation with plan of care reviewed and developed with the advanced practice clinician (APC).      Brief Summary Statement:   Hardeep James is a 64 y.o. male with a past medical history of depression, hypertension, peripheral neuropathy, presents to the ED with complaints of shortness of breath and bilateral lower extremity swelling.  Patient reports shortness of breath over the past 2 to 3 weeks associated with a 10 to 20 pound weight gain.  He also reports orthopnea, bilateral lower extremity edema.  Another concerning feature is chest pressure that occurs with exertion.  He has never experienced this before.  Patient was mildly tachycardic on presentation.  BNP was significantly elevated.  CMP remarkable for mildly elevated ALT and AST but otherwise was unremarkable.  CBC within normal limits.  Chest x-ray on presentation with pulmonary vascular congestion.  Overall, I suspect the patient is having heart failure.  My concerns would be for ischemia given his chest pressure associated with exertion.  We will get an echo to evaluate for systolic heart failure.  We will also get cardiology involved as patient may need further evaluation of his chest pressure.  Received Lasix in the ER.  Will give an additional dose in the morning.  Strict ins and outs.  He is not currently having active chest pain.    Remainder of detailed HPI is as noted by APC and has been reviewed and/or edited by me for completeness.    Attending Physical Exam:  Constitutional: Awake, alert, no acute distress, pleasant  Eyes: PERRLA, sclerae anicteric, no conjunctival injection  HENT: NCAT, mucous membranes moist  Neck: Supple, no  thyromegaly, no lymphadenopathy, trachea midline  Respiratory: Crackles at bases bilaterally, nonlabored respirations on room air  Cardiovascular: RRR, no murmurs, rubs, or gallops, palpable pedal pulses bilaterally  Gastrointestinal: Positive bowel sounds, soft, nontender, nondistended  Musculoskeletal: Bilateral lower extremity edema  Psychiatric: Appropriate affect, cooperative  Neurologic: Oriented x 3, strength symmetric in all extremities, Cranial Nerves grossly intact to confrontation, speech clear  Skin: No rashes      Brief Assessment/Plan :  See detailed assessment and plan developed with APC which I have reviewed and/or edited for completeness.    Electronically signed by Gris King MD, 02/07/20, 10:54 PM.

## 2020-02-08 NOTE — PLAN OF CARE
Pt. Has not complaints of chest pain or shortness of air. He is hopeful to go home in am. Medications have been adjusted per cardiology. VSS

## 2020-02-08 NOTE — CONSULTS
[facility] AT [address], [address]  [place], [place], [address] [phone]  [email or web address]           INPATIENT CONSULTATION NOTE    Patient Care Team:  Patient Care Team:  Mónica Anthony MD as PCP - General  Mónica Anthony MD as PCP - Family Medicine    Requesting Provider and Reason for consultation: The patient is being seen today at the request of Dr. Pollard for chest pain, shortness of breath.     Chief complaint:   Chief Complaint   Patient presents with   • Abnormal Lab            HPI:    Hardeep James is a 64 y.o. male.  Cardiac problem list:  1. Dyspnea, lower extremity edema, chest pressure syndrome, new as of 1/2020  2. Hypertension  3. Peripheral neuropathy  4. Depression    He presented to [facility] ED yesterday with complaints of shortness of breath, bilateral lower extremity edema, and chest pressure.  His shortness of breath has been worsening over the past 3 weeks.  He had orthopnea and was sleeping on more pillows than normal and also attempted to sleep in the recliner.  He developed lower extremity edema on Tuesday prior to arrival.  He was evaluated by his PCP who noted that his BNP was over 7000 and was referred to the ED.  proBNP here was noted to be 7470, troponin x3 unremarkable.  EKG without acute ischemic changes.  He was given Lasix IVP x2 doses with improvement in his symptoms.  He denies any recurrent chest tightness.  He does continue to have shortness of breath and lower extremity edema.  He denies any palpitations, lightheadedness, or syncope.    He denies any familial history of CAD.  He reports he is a former smoker quitting approximately 3 weeks ago at the onset of his symptoms.  He had smoked since 1997.  He also reports alcohol use he drinks 10-15 shots 3 to 4 days a week.  He denies any drug use.  He has never been evaluated by cardiologist in the past or undergone any cardiac testing.  He does note that prior to being diagnosed  with peripheral neuropathy neuropathy he underwent arterial and venous duplex.  He does not participate in any formal exercise typically but does stay active and well at times ride a stationary bike.    Review of Systems:  Positive for chest tightness, shortness of breath, lower extremity edema, orthopnea  All other systems reviewed are negative.    PFSH:  Patient Active Problem List   Diagnosis   • Acute congestive heart failure (CMS/HCC)   • A-fib (CMS/[facility])   • HTN (hypertension)   • Depression   • New onset of congestive heart failure (CMS/[facility])       No current facility-administered medications on file prior to encounter.      Current Outpatient Medications on File Prior to Encounter   Medication Sig Dispense Refill   • DULoxetine (CYMBALTA) 30 MG capsule Take 30 mg by mouth Daily.     • ibuprofen (ADVIL,MOTRIN) 800 MG tablet Take 800 mg by mouth Every 6 (Six) Hours As Needed for Mild Pain .     • lisinopril (PRINIVIL,ZESTRIL) 5 MG tablet Take 5 mg by mouth Daily.     • Multiple Vitamins-Minerals (CENTRUM ADULTS PO) Take 1 tablet by mouth Daily.     • triamterene-hydrochlorothiazide (MAXZIDE-25) 37.5-25 MG per tablet Take 1 tablet by mouth Daily.     • vitamin C (ASCORBIC ACID) 250 MG tablet Take 250 mg by mouth Daily.     • vitamin E 100 UNIT capsule Take 100 Units by mouth Daily.       No Known Allergies    Social History     Socioeconomic History   • Marital status:      Spouse name: Not on file   • Number of children: Not on file   • Years of education: Not on file   • Highest education level: Not on file   Tobacco Use   • Smoking status: Former Smoker     Last attempt to quit: 2020     Years since quittin.0   Substance and Sexual Activity   • Alcohol use: Yes     Comment: fireball on the weekends   • Drug use: Never   • Sexual activity: Defer     History reviewed. No pertinent family history.         Objective:     Vital Sign Min/Max for last 24 hours  Temp  Min: 98 °F (36.7 °C)  Max: 98.5 °F  (36.9 °C)   BP  Min: 112/96  Max: 140/111   Pulse  Min: 61  Max: 120   Resp  Min: 16  Max: 20   SpO2  Min: 93 %  Max: 97 %   No data recorded      Intake/Output Summary (Last 24 hours) at 2/8/2020 0930  Last data filed at 2/8/2020 0900  Gross per 24 hour   Intake 480 ml   Output 500 ml   Net -20 ml           Vitals:    02/08/20 0725   BP: (!) 138/118   Pulse: 92   Resp:    Temp:    SpO2: 93%       CONSTITUTIONAL: Well-nourished. In no acute distress.   SKIN: Warm and dry. No rashes noted  HEENT: Head is normocephalic and atraumatic. Mucous membranes are pink and moist.   NECK: Supple without masses or thyromegaly.   LUNGS: Normal effort.  Mild rales noted bilaterally.  CARDIOVASCULAR: The heart has a regular rate and rhythm with a normal S1 and S2.  Frequent ectopic beats.  There is no murmur, gallop, rub, or click appreciated.   PERIPHERAL VASCULAR: Carotid upstroke is 2+ bilaterally and without bruits. Radial pulses are 2+ bilaterally. Posterior tibial pulses are non-palpable. There is 2+ lower extremity edema bilaterally.   ABDOMEN: Normal bowel sounds.  Soft with no tenderness with palpitation. No hepatosplenomegaly  MUSCULOSKELETAL:  No digital cyanosis  NEUROLOGICAL: Nonfocal.  PSYCHIATRIC: Alert, orientated x 3, appropriate affect     Labs:  Lab Results   Component Value Date    TROPONINT 0.012 02/08/2020       Glucose   Date Value Ref Range Status   02/08/2020 110 (H) 65 - 99 mg/dL Final     BUN   Date Value Ref Range Status   02/08/2020 13 8 - 23 mg/dL Final     Creatinine   Date Value Ref Range Status   02/08/2020 0.88 0.76 - 1.27 mg/dL Final     Sodium   Date Value Ref Range Status   02/08/2020 137 136 - 145 mmol/L Final     Potassium   Date Value Ref Range Status   02/08/2020 3.4 (L) 3.5 - 5.2 mmol/L Final     Chloride   Date Value Ref Range Status   02/08/2020 100 98 - 107 mmol/L Final     CO2   Date Value Ref Range Status   02/08/2020 23.0 22.0 - 29.0 mmol/L Final     Calcium   Date Value Ref Range  Status   02/08/2020 8.7 8.6 - 10.5 mg/dL Final     Total Protein   Date Value Ref Range Status   02/07/2020 6.2 6.0 - 8.5 g/dL Final     Albumin   Date Value Ref Range Status   02/07/2020 3.90 3.50 - 5.20 g/dL Final     ALT (SGPT)   Date Value Ref Range Status   02/07/2020 80 (H) 1 - 41 U/L Final     AST (SGOT)   Date Value Ref Range Status   02/07/2020 49 (H) 1 - 40 U/L Final     Alkaline Phosphatase   Date Value Ref Range Status   02/07/2020 74 39 - 117 U/L Final     Total Bilirubin   Date Value Ref Range Status   02/07/2020 0.4 0.2 - 1.2 mg/dL Final     eGFR Non  Amer   Date Value Ref Range Status   02/08/2020 88 >60 mL/min/1.73 Final     BUN/Creatinine Ratio   Date Value Ref Range Status   02/08/2020 14.8 7.0 - 25.0 Final     Anion Gap   Date Value Ref Range Status   02/08/2020 14.0 5.0 - 15.0 mmol/L Final       Lab Results   Component Value Date    CHOL 123 02/08/2020     Lab Results   Component Value Date    TRIG 66 02/08/2020     Lab Results   Component Value Date    HDL 49 02/08/2020     Lab Results   Component Value Date    LDL 61 02/08/2020     No components found for: LDLDIRECTC      WBC   Date Value Ref Range Status   02/08/2020 6.72 3.40 - 10.80 10*3/mm3 Final     RBC   Date Value Ref Range Status   02/08/2020 4.23 4.14 - 5.80 10*6/mm3 Final     Hemoglobin   Date Value Ref Range Status   02/08/2020 14.1 13.0 - 17.7 g/dL Final     Hematocrit   Date Value Ref Range Status   02/08/2020 43.0 37.5 - 51.0 % Final     MCV   Date Value Ref Range Status   02/08/2020 101.7 (H) 79.0 - 97.0 fL Final     MCH   Date Value Ref Range Status   02/08/2020 33.3 (H) 26.6 - 33.0 pg Final     MCHC   Date Value Ref Range Status   02/08/2020 32.8 31.5 - 35.7 g/dL Final     RDW   Date Value Ref Range Status   02/08/2020 14.6 12.3 - 15.4 % Final     RDW-SD   Date Value Ref Range Status   02/08/2020 54.5 (H) 37.0 - 54.0 fl Final     MPV   Date Value Ref Range Status   02/08/2020 11.2 6.0 - 12.0 fL Final     Platelets      Date Value Ref Range Status   02/08/2020 177 140 - 450 10*3/mm3 Final     Neutrophil %   Date Value Ref Range Status   02/07/2020 71.6 42.7 - 76.0 % Final     Lymphocyte %   Date Value Ref Range Status   02/07/2020 14.2 (L) 19.6 - 45.3 % Final     Monocyte %   Date Value Ref Range Status   02/07/2020 12.2 (H) 5.0 - 12.0 % Final     Eosinophil %   Date Value Ref Range Status   02/07/2020 0.8 0.3 - 6.2 % Final     Basophil %   Date Value Ref Range Status   02/07/2020 0.9 0.0 - 1.5 % Final     Immature Grans %   Date Value Ref Range Status   02/07/2020 0.3 0.0 - 0.5 % Final     Neutrophils, Absolute   Date Value Ref Range Status   02/07/2020 4.76 1.70 - 7.00 10*3/mm3 Final     Lymphocytes, Absolute   Date Value Ref Range Status   02/07/2020 0.94 0.70 - 3.10 10*3/mm3 Final     Monocytes, Absolute   Date Value Ref Range Status   02/07/2020 0.81 0.10 - 0.90 10*3/mm3 Final     Eosinophils, Absolute   Date Value Ref Range Status   02/07/2020 0.05 0.00 - 0.40 10*3/mm3 Final     Basophils, Absolute   Date Value Ref Range Status   02/07/2020 0.06 0.00 - 0.20 10*3/mm3 Final     Immature Grans, Absolute   Date Value Ref Range Status   02/07/2020 0.02 0.00 - 0.05 10*3/mm3 Final     nRBC   Date Value Ref Range Status   02/07/2020 0.0 0.0 - 0.2 /100 WBC Final         Diagnostic Data:    EKG 2/8/2020 0516:  Sinus rhythm with premature atrial complexes with aberrant conduction  Right bundle branch block    Tele: Normal sinus rhythm with frequent PACs, occasional PVCs         Assessment and Plan:   ASSESSMENT:  -Fluid volume overload, no prior history of congestive heart failure, clinically improving with Lasix.  Clinical symptoms are concerning for a new acute systolic heart failure, of unclear etiology.  Possibly related to alcohol use.    -Frequent ectopy, will monitor for arrhythmia such as atrial fibrillation especially in light of his alcohol use  -Chest pain, troponin x3 unremarkable, EKG without acute ischemic changes.  No  known history of CAD.  Reportedly normal cardiac catheterization in 2005.    -Prior lower extremity discomfort, underwent arterial and venous duplexes around 2005  -Hypertension, stable on outpatient medications  -Peripheral neuropathy  -Alcohol abuse, at risk for withdrawal    PLAN:  -Potassium placement per protocol.  -Transthoracic echocardiogram ordered and pending  -Discontinued Maxide and started Coreg 12.5 mg twice daily.  -We will give 1 additional dose of Lasix IVP today, then twice daily.  -Continue strict I's and O's and daily weights.  -CXR in a.m.  -We will plan for stress testing as an outpatient to evaluate for ischemia.  -Monitor for alcohol withdrawal monitoring for alcohol withdrawal and management per the primary team  -Strongly advised decreasing alcohol consumption.  Discussed the cardiac risks to the patient today including the risk of heart failure and arrhythmia  -If clinically improved tomorrow, may be reasonable for discharge from a cardiac standpoint tomorrow.      Scribed for Dr. Jacobs by DAVID Ramirez. 2/8/2020  10:17 AM    I, Ron Jacobs MD, personally performed the services as scribed by the above named individual. I have made any necessary edits and it is both accurate and complete.     Ron Jacobs MD, MSc, [facility]  Interventional Cardiology  [facility] at [facility]

## 2020-02-09 ENCOUNTER — APPOINTMENT (OUTPATIENT)
Dept: GENERAL RADIOLOGY | Facility: HOSPITAL | Age: 65
End: 2020-02-09

## 2020-02-09 VITALS
HEART RATE: 88 BPM | OXYGEN SATURATION: 94 % | SYSTOLIC BLOOD PRESSURE: 92 MMHG | BODY MASS INDEX: 27.59 KG/M2 | TEMPERATURE: 98.1 F | WEIGHT: 208.2 LBS | DIASTOLIC BLOOD PRESSURE: 64 MMHG | HEIGHT: 73 IN | RESPIRATION RATE: 18 BRPM

## 2020-02-09 PROBLEM — I50.21 ACUTE SYSTOLIC CONGESTIVE HEART FAILURE (HCC): Status: ACTIVE | Noted: 2020-02-07

## 2020-02-09 PROBLEM — I49.1 ATRIAL ECTOPY: Status: ACTIVE | Noted: 2020-02-09

## 2020-02-09 PROBLEM — I42.9 CARDIOMYOPATHY: Status: ACTIVE | Noted: 2020-02-09

## 2020-02-09 LAB
ANION GAP SERPL CALCULATED.3IONS-SCNC: 12 MMOL/L (ref 5–15)
BUN BLD-MCNC: 14 MG/DL (ref 8–23)
BUN/CREAT SERPL: 15.4 (ref 7–25)
CALCIUM SPEC-SCNC: 8.8 MG/DL (ref 8.6–10.5)
CHLORIDE SERPL-SCNC: 100 MMOL/L (ref 98–107)
CO2 SERPL-SCNC: 24 MMOL/L (ref 22–29)
CREAT BLD-MCNC: 0.91 MG/DL (ref 0.76–1.27)
DEPRECATED RDW RBC AUTO: 53.6 FL (ref 37–54)
ERYTHROCYTE [DISTWIDTH] IN BLOOD BY AUTOMATED COUNT: 14.6 % (ref 12.3–15.4)
GFR SERPL CREATININE-BSD FRML MDRD: 84 ML/MIN/1.73
GLUCOSE BLD-MCNC: 132 MG/DL (ref 65–99)
HCT VFR BLD AUTO: 43.8 % (ref 37.5–51)
HGB BLD-MCNC: 14.6 G/DL (ref 13–17.7)
MCH RBC QN AUTO: 33.3 PG (ref 26.6–33)
MCHC RBC AUTO-ENTMCNC: 33.3 G/DL (ref 31.5–35.7)
MCV RBC AUTO: 100 FL (ref 79–97)
PLATELET # BLD AUTO: 191 10*3/MM3 (ref 140–450)
PMV BLD AUTO: 10.8 FL (ref 6–12)
POTASSIUM BLD-SCNC: 3.9 MMOL/L (ref 3.5–5.2)
RBC # BLD AUTO: 4.38 10*6/MM3 (ref 4.14–5.8)
SODIUM BLD-SCNC: 136 MMOL/L (ref 136–145)
WBC NRBC COR # BLD: 8.21 10*3/MM3 (ref 3.4–10.8)

## 2020-02-09 PROCEDURE — 96376 TX/PRO/DX INJ SAME DRUG ADON: CPT

## 2020-02-09 PROCEDURE — 99214 OFFICE O/P EST MOD 30 MIN: CPT | Performed by: NURSE PRACTITIONER

## 2020-02-09 PROCEDURE — 99217 PR OBSERVATION CARE DISCHARGE MANAGEMENT: CPT | Performed by: INTERNAL MEDICINE

## 2020-02-09 PROCEDURE — 71045 X-RAY EXAM CHEST 1 VIEW: CPT

## 2020-02-09 PROCEDURE — 25010000002 FUROSEMIDE PER 20 MG: Performed by: NURSE PRACTITIONER

## 2020-02-09 PROCEDURE — 80048 BASIC METABOLIC PNL TOTAL CA: CPT | Performed by: INTERNAL MEDICINE

## 2020-02-09 PROCEDURE — G0378 HOSPITAL OBSERVATION PER HR: HCPCS

## 2020-02-09 PROCEDURE — 85027 COMPLETE CBC AUTOMATED: CPT | Performed by: INTERNAL MEDICINE

## 2020-02-09 RX ORDER — FUROSEMIDE 40 MG/1
40 TABLET ORAL DAILY
Status: DISCONTINUED | OUTPATIENT
Start: 2020-02-10 | End: 2020-02-09 | Stop reason: HOSPADM

## 2020-02-09 RX ORDER — VALSARTAN 40 MG/1
40 TABLET ORAL
Qty: 60 TABLET | Refills: 5 | Status: SHIPPED | OUTPATIENT
Start: 2020-02-09 | End: 2021-01-18

## 2020-02-09 RX ORDER — VALSARTAN 80 MG/1
40 TABLET ORAL
Status: DISCONTINUED | OUTPATIENT
Start: 2020-02-09 | End: 2020-02-09 | Stop reason: HOSPADM

## 2020-02-09 RX ORDER — FUROSEMIDE 40 MG/1
40 TABLET ORAL DAILY
Qty: 30 TABLET | Refills: 5 | Status: SHIPPED | OUTPATIENT
Start: 2020-02-10 | End: 2020-08-05

## 2020-02-09 RX ORDER — CARVEDILOL 12.5 MG/1
12.5 TABLET ORAL 2 TIMES DAILY WITH MEALS
Qty: 60 TABLET | Refills: 5 | Status: SHIPPED | OUTPATIENT
Start: 2020-02-09 | End: 2020-06-15

## 2020-02-09 RX ORDER — ASPIRIN 81 MG/1
81 TABLET ORAL DAILY
Qty: 90 TABLET | Refills: 1 | Status: SHIPPED | OUTPATIENT
Start: 2020-02-09

## 2020-02-09 RX ORDER — ACETAMINOPHEN 325 MG/1
650 TABLET ORAL EVERY 6 HOURS PRN
Start: 2020-02-09

## 2020-02-09 RX ADMIN — DULOXETINE HYDROCHLORIDE 30 MG: 30 CAPSULE, DELAYED RELEASE ORAL at 08:56

## 2020-02-09 RX ADMIN — OXYCODONE HYDROCHLORIDE AND ACETAMINOPHEN 250 MG: 500 TABLET ORAL at 08:56

## 2020-02-09 RX ADMIN — MULTIPLE VITAMINS W/ MINERALS TAB 1 TABLET: TAB ORAL at 08:56

## 2020-02-09 RX ADMIN — SODIUM CHLORIDE, PRESERVATIVE FREE 10 ML: 5 INJECTION INTRAVENOUS at 08:57

## 2020-02-09 RX ADMIN — VITAMIN E CAP 100 UNIT 100 UNITS: 100 CAP at 08:56

## 2020-02-09 RX ADMIN — VALSARTAN 40 MG: 80 TABLET, FILM COATED ORAL at 11:35

## 2020-02-09 RX ADMIN — FUROSEMIDE 40 MG: 10 INJECTION, SOLUTION INTRAMUSCULAR; INTRAVENOUS at 08:57

## 2020-02-09 RX ADMIN — CARVEDILOL 12.5 MG: 12.5 TABLET, FILM COATED ORAL at 08:57

## 2020-02-09 NOTE — PROGRESS NOTES
[facility] Medicine Services  PROGRESS NOTE    Patient Name: Hardeep James  : 1955  MRN: [id number]    Date of Admission: 2020  Primary Care Physician: Mónica Anthony MD    Subjective   Subjective     CC:  Follow-up congestive heart failure    HPI:  Dyspnea now completely resolved.  Leg swelling mostly resolved.  Patient says he is back to his normal weight.  He is requesting discharge home today.  No further chest pressure reported.  No other new complaint.      Review of Systems  No current fevers or chills  No current shortness of breath or cough  No current nausea, vomiting, or diarrhea  No current dysuria or hematuria    Objective   Objective     Vital Signs:   Temp:  [97.9 °F (36.6 °C)-98.9 °F (37.2 °C)] 98.1 °F (36.7 °C)  Heart Rate:  [] 83  Resp:  [16-18] 18  BP: (105-140)/() 106/94        Physical Exam:  Constitutional:Awake, alert  HENT: NCAT, mucous membranes moist, neck supple  Respiratory: Lungs are clear tachypnea resolved, breathing on room air oxygen  Cardiovascular: RRR, normal radial pulses  Gastrointestinal: Positive bowel sounds, soft, nontender, nondistended  Musculoskeletal: Normal musculature for age, trace bilateral lower extremity edema, BMI 29  Psychiatric: Appropriate affect, cooperative, conversational  Neurologic: No slurred speech or facial droop, follows commands, not confused   Skin: No rashes or jaundice, warm      Results Reviewed:  Results from last 7 days   Lab Units 20  04320  04320  1706   WBC 10*3/mm3 8.21 6.72 6.64   HEMOGLOBIN g/dL 14.6 14.1 15.1   HEMATOCRIT % 43.8 43.0 45.7   PLATELETS 10*3/mm3 191 177 206     Results from last 7 days   Lab Units 20  0436 20  1802 20  0431 20  2246 20  1706  20  1215   SODIUM mmol/L 136  --  137  --  139  --   --    POTASSIUM mmol/L 3.9 3.7 3.4*  --  3.5   < >  --    CHLORIDE mmol/L 100  --  100  --  101  --   --    CO2 mmol/L  24.0  --  23.0  --  25.0  --   --    BUN mg/dL 14  --  13  --  13  --   --    CREATININE mg/dL 0.91  --  0.88  --  1.03  --   --    GLUCOSE mg/dL 132*  --  110*  --  86  --   --    CALCIUM mg/dL 8.8  --  8.7  --  9.5  --   --    ALT (SGPT) U/L  --   --   --   --  80*  --   --    AST (SGOT) U/L  --   --   --   --  49*  --   --    TROPONIN T ng/mL  --   --  0.012 0.014 <0.010  --   --    PROBNP pg/mL  --   --   --   --  7,470.0*  --  7,347.0*    < > = values in this interval not displayed.     Estimated Creatinine Clearance: 112.4 mL/min (by C-G formula based on SCr of 0.91 mg/dL).    Microbiology Results Abnormal     None          Imaging Results (Last 24 Hours)     Procedure Component Value Units Date/Time    XR Chest 1 View [[id number]] Resulted:  02/09/20 0441     Updated:  02/09/20 0520          Results for orders placed during the hospital encounter of 02/07/20   Adult Transthoracic Echo Complete With Contrast if Necessary Per Protocol    Narrative · Left ventricular systolic function is moderately decreased. Estimated EF   appears to be in the range of 31 - 35%.  · Left ventricular wall thickness is consistent with mild-to-moderate   concentric hypertrophy.  · Right ventricular cavity is borderline dilated.  · Mildly reduced right ventricular systolic function noted.  · Left atrial volume is moderately increased.  · Right atrial cavity size is mildly dilated.  · Mild-to-moderate mitral valve regurgitation is present  · Mild to moderate tricuspid valve regurgitation is present.  · Estimated right ventricular systolic pressure from tricuspid   regurgitation is mildly elevated (35-45 mmHg).  · Mild dilation of the sinuses of Valsalva is present.          I have reviewed the medications:  Scheduled Meds:    carvedilol 12.5 mg Oral BID With Meals   DULoxetine 30 mg Oral Daily   enoxaparin 40 mg Subcutaneous Q24H   furosemide 40 mg Intravenous BID   multivitamin with minerals 1 tablet Oral Daily   pharmacy consult -  MTM  Does not apply Daily   [START ON 2/10/2020] sacubitril-valsartan 1 tablet Oral Q12H   sodium chloride 10 mL Intravenous Q12H   vitamin C 250 mg Oral Daily   vitamin E 100 Units Oral Daily     Continuous Infusions:   PRN Meds:.•  acetaminophen  •  famotidine  •  melatonin  •  ondansetron  •  potassium chloride **OR** potassium chloride **OR** potassium chloride  •  sodium chloride  •  sodium chloride    Assessment/Plan   Assessment & Plan     Active Hospital Problems    Diagnosis  POA   • **Acute congestive heart failure (CMS/HCC) [I50.9]  Yes   • A-fib (CMS/HCC) [I48.91]  Yes   • HTN (hypertension) [I10]  Yes   • Depression [F32.9]  Yes   • New onset of congestive heart failure (CMS/HCC) [I50.9]  Yes      Resolved Hospital Problems   No resolved problems to display.        Brief Hospital Course to date:  Hardeep James is a 64 y.o. male with past medical history of hypertension presents the hospital with new onset congestive heart failure with pitting peripheral edema, pulmonary edema on chest x-ray, and tachycardia which emergency department reported was rapid episode of atrial fibrillation.    Plan: Echocardiogram results reviewed EF is 35% range.  Continue carvedilol, continue lisinopril.  Plan discussed case with cardiology to see if patient can discharge home today.  Outpatient stress test currently planned per their note.  Overall substantially improved.    Acute systolic congestive heart failure  ED reported episode of atrial fibrillation, resolved  Sinus tachycardia with frequent PACs, resolved  Chest x-ray images reviewed pulmonary edema noted.  Initially hypoxic in the ER but improved with Lasix  Echocardiogram EF 31 to 35%  Cardiology consult team evaluated, appreciate recommendations  Continue lisinopril, carvedilol    Chest pressure with exertion:  Troponin negative  EKG without ST elevation  Outpatient stress test    Depression: Stable.  Continue home medication, no SI    Essential hypertension:  Currently on lisinopril and carvedilol    DVT Prophylaxis:   LVX    Disposition: I expect the patient to be discharged home soon  CODE STATUS:   Code Status and Medical Interventions:   Ordered at: 02/07/20 2051     Level Of Support Discussed With:    Patient     Code Status:    CPR     Medical Interventions (Level of Support Prior to Arrest):    Full         Electronically signed by Jaylan Pollard MD, 02/09/20, 6:48 AM.

## 2020-02-09 NOTE — DISCHARGE SUMMARY
[facility] Medicine Services  DISCHARGE SUMMARY    Patient Name: Hardeep James  : 1955  MRN: [id number]    Date of Admission: 2020  5:37 PM  Date of Discharge:  2020  Primary Care Physician: Mónica Anthony MD    Consults     Date and Time Order Name Status Description    2020 0030 Inpatient Cardiology Consult Completed           Hospital Course     Presenting Problem:   New onset of congestive heart failure (CMS/HCC) [I50.9]  New onset of congestive heart failure (CMS/HCC) [I50.9]    Active Hospital Problems    Diagnosis  POA   • **Acute systolic congestive heart failure (CMS/HCC) [I50.21]  Yes   • Cardiomyopathy (CMS/HCC) [I42.9]  Yes   • Atrial ectopy [I49.1]  Yes   • HTN (hypertension) [I10]  Yes   • Depression [F32.9]  Yes   • New onset of congestive heart failure (CMS/HCC) [I50.9]  Yes      Resolved Hospital Problems   No resolved problems to display.          Hospital Course:  Hardeep James is a 64 y.o. male  with past medical history of hypertension presents the hospital with new onset systolic congestive heart failure with pitting peripheral edema, pulmonary edema on chest x-ray, and tachycardia which emergency department reported was rapid episode of atrial fibrillation.  He was treated with IV diuresis and rapidly improved.  Echocardiogram showed EF in the 35% range.  Patient was felt to need to go home on carvedilol and valsartan.  Cardiology assisting with management.  They recommend outpatient stress test.  They have referred him for a LifeVest.  LifeVest could not be done on  and patient preferred to go home and follow-up with LifeVest people as arranged by case management on Monday as opposed to stay in the hospital until Monday to wait for LifeVest.  Risks versus benefits were explained to him before he left.  Patient is eager to go home and agrees to follow-up with primary care provider and with cardiology in follow-up.  He will also follow-up  in heart failure clinic as per cardiology recommendations.     Acute systolic congestive heart failure  ED reported episode of atrial fibrillation, resolved  Sinus tachycardia with frequent PACs, resolved  Chest x-ray images reviewed pulmonary edema noted initially at admission.  Initially hypoxic in the ER but improved with Lasix IV  Echocardiogram EF 31 to 35%  Cardiology consult team evaluated, appreciate recommendations  Continue valsartan, carvedilol at discharge     Chest pressure with exertion:  Troponin negative  EKG without ST elevation  Outpatient stress test per cardiology recommendation     Depression: Stable.  Continue home medication, no SI     Essential hypertension: Currently on lisinopril and carvedilol    Will need repeat transthoracic echocardiogram to assess EF no sooner than 5/8/2020.    Cardiology recommends heart and valve clinic follow-up on Friday with a BMP.    They are arranging for outpatient stress test in cardiology clinic follow-up in 8 weeks.    At the time of discharge patient was told to take all medications as prescribed, keep all follow-up appointments, and call their doctor or return to the hospital with any worsening or concerning symptoms.    Please note that dragon voice recognition software was used to create this note and that transcription errors are possible.        Day of Discharge     HPI:   Dyspnea now completely resolved.  Leg swelling mostly resolved.  Patient says he is back to his normal weight.  He is requesting discharge home today.  No further chest pressure reported.  No other new complaint.      Review of Systems  No current fevers or chills  No current shortness of breath or cough  No current nausea, vomiting, or diarrhea  No current dysuria or hematuria    Vital Signs:   Heart Rate:  [88] 88     Physical Exam:  Constitutional:Awake, alert  HENT: NCAT, mucous membranes moist, neck supple  Respiratory: Lungs are clear, tachypnea resolved, breathing on room air  oxygen  Cardiovascular: RRR, normal radial pulses  Gastrointestinal: Positive bowel sounds, soft, nontender, nondistended  Musculoskeletal: Normal musculature for age, trace bilateral lower extremity edema, BMI 29  Psychiatric: Appropriate affect, cooperative, conversational  Neurologic: No slurred speech or facial droop, follows commands, not confused   Skin: No rashes or jaundice, warm       Pertinent  and/or Most Recent Results     Results from last 7 days   Lab Units 02/09/20  0436 02/08/20  1802 02/08/20  0431 02/07/20  1706   WBC 10*3/mm3 8.21  --  6.72 6.64   HEMOGLOBIN g/dL 14.6  --  14.1 15.1   HEMATOCRIT % 43.8  --  43.0 45.7   PLATELETS 10*3/mm3 191  --  177 206   SODIUM mmol/L 136  --  137 139   POTASSIUM mmol/L 3.9 3.7 3.4* 3.5   CHLORIDE mmol/L 100  --  100 101   CO2 mmol/L 24.0  --  23.0 25.0   BUN mg/dL 14  --  13 13   CREATININE mg/dL 0.91  --  0.88 1.03   GLUCOSE mg/dL 132*  --  110* 86   CALCIUM mg/dL 8.8  --  8.7 9.5     Results from last 7 days   Lab Units 02/07/20  1706   BILIRUBIN mg/dL 0.4   ALK PHOS U/L 74   ALT (SGPT) U/L 80*   AST (SGOT) U/L 49*     Results from last 7 days   Lab Units 02/08/20  0431   CHOLESTEROL mg/dL 123   TRIGLYCERIDES mg/dL 66   HDL CHOL mg/dL 49     Results from last 7 days   Lab Units 02/08/20  0431 02/07/20  2246 02/07/20  1706 02/07/20  1215   TSH uIU/mL 2.090  --   --   --    HEMOGLOBIN A1C % 5.70*  --   --   --    PROBNP pg/mL  --   --  7,470.0* 7,347.0*   TROPONIN T ng/mL 0.012 0.014 <0.010  --        Brief Urine Lab Results     None          Microbiology Results Abnormal     None          Imaging Results (All)     Procedure Component Value Units Date/Time    XR Chest 1 View [[id number]] Collected:  02/09/20 1010     Updated:  02/09/20 1010    Narrative:          EXAMINATION: XR CHEST 1 VW-      INDICATION: Pulmonary congestion; I50.9-Heart failure, unspecified;  R06.02-Shortness of breath; R60.0-Localized edema      COMPARISON: To 7 2020     FINDINGS: Portable  chest reveals heart to be enlarged. Underlying  chronic and emphysematous changes in with the lung fields bilaterally.  No focal     Opacification present. A pleural effusion or pneumothorax. Degenerative  changes seen within the spine. Pulmonary vascularity is slightly  pronounced however not significantly changed in the interval.           Impression:       Stable chest as above.                          Results for orders placed during the hospital encounter of 02/07/20   Adult Transthoracic Echo Complete With Contrast if Necessary Per Protocol    Narrative · Left ventricular systolic function is moderately decreased. Estimated EF   appears to be in the range of 31 - 35%.  · Left ventricular wall thickness is consistent with mild-to-moderate   concentric hypertrophy.  · Right ventricular cavity is borderline dilated.  · Mildly reduced right ventricular systolic function noted.  · Left atrial volume is moderately increased.  · Right atrial cavity size is mildly dilated.  · Mild-to-moderate mitral valve regurgitation is present  · Mild to moderate tricuspid valve regurgitation is present.  · Estimated right ventricular systolic pressure from tricuspid   regurgitation is mildly elevated (35-45 mmHg).  · Mild dilation of the sinuses of Valsalva is present.          Discharge Details        Discharge Medications      New Medications      Instructions Start Date   acetaminophen 325 MG tablet  Commonly known as:  TYLENOL   650 mg, Oral, Every 6 Hours PRN      aspirin 81 MG EC tablet   81 mg, Oral, Daily      carvedilol 12.5 MG tablet  Commonly known as:  COREG   12.5 mg, Oral, 2 Times Daily With Meals      furosemide 40 MG tablet  Commonly known as:  LASIX   40 mg, Oral, Daily      valsartan 40 MG tablet  Commonly known as:  DIOVAN   40 mg, Oral, Every 24 Hours Scheduled         Continue These Medications      Instructions Start Date   CENTRUM ADULTS PO   1 tablet, Oral, Daily      DULoxetine 30 MG capsule  Commonly  known as:  CYMBALTA   30 mg, Oral, Daily      vitamin C 250 MG tablet  Commonly known as:  ASCORBIC ACID   250 mg, Oral, Daily      vitamin E 100 UNIT capsule   100 Units, Oral, Daily         Stop These Medications    ibuprofen 800 MG tablet  Commonly known as:  ADVIL,MOTRIN     lisinopril 5 MG tablet  Commonly known as:  PRINIVIL,ZESTRIL     triamterene-hydrochlorothiazide 37.5-25 MG per tablet  Commonly known as:  MAXZIDE-25            No Known Allergies      Discharge Disposition:  Home or Self Care    Diet:  Hospital:  No active diet order      Activity:  Activity Instructions     Up WIth Assist               CODE STATUS:    Code Status and Medical Interventions:   Ordered at: 02/07/20 2051     Level Of Support Discussed With:    Patient     Code Status:    CPR     Medical Interventions (Level of Support Prior to Arrest):    Full       Future Appointments   Date Time Provider Department Center   2/14/2020 10:15 AM Tsering Dias APRN [facility]       Additional Instructions for the Follow-ups that You Need to Schedule     Discharge Follow-up with PCP   As directed       Currently Documented PCP:    Mónica Anthony MD    PCP Phone Number:    [phone]     Follow Up Details:  7-10 days         Discharge Follow-up with Specialty: Dr. Jacobs   As directed      Specialty:  Dr. Jacobs    Follow Up Details:  8 weeks         Discharge Follow-up with Specialty: [facility]   As directed      Specialty:  [facility]    Follow Up Details:  2/14/2020 with [facility]         Discharge Follow-up with Specialty: Insurance will call tomorrow about life vest arrangements.   As directed      Specialty:  Insurance will call tomorrow about life vest arrangements.         Basic Metabolic Panel    Feb 14, 2020 (Approximate)            Time Spent on Discharge:  40 minutes    Electronically signed by Jaylan Pollard MD, 02/09/20, 1:02 PM.

## 2020-02-09 NOTE — PLAN OF CARE
Problem: Patient Care Overview  Goal: Plan of Care Review  Outcome: Ongoing (interventions implemented as appropriate)  Flowsheets (Taken 2/9/2020 9400)  Progress: improving  Plan of Care Reviewed With: patient  Outcome Summary: VSS on room air. Pt had no complaints, rested well. will continue to monitor.      89 Y/O M ON AC S/P FALL FROM STANDING. ALL DIAGNOSTIC TESTING REVEIWED. PT EVALUATED BY TRAUMA. AMBULATING WITH ASSISTANCE AS AT HOME AS PER ATR ROMAN. WILL DISCHARGE TO HOME.

## 2020-02-09 NOTE — PROGRESS NOTES
Discharge Planning Assessment  [facility]     Patient Name: Hardeep James  MRN: [id number]  Today's Date: 2/9/2020    Admit Date: 2/7/2020    Discharge Needs Assessment     Row Name 02/09/20 1142       Living Environment    Lives With  spouse    Current Living Arrangements  home/apartment/condo    Primary Care Provided by  self    Provides Primary Care For  no one    Family Caregiver if Needed  spouse    Quality of Family Relationships  unable to assess    Able to Return to Prior Arrangements  yes       Resource/Environmental Concerns    Resource/Environmental Concerns  none    Transportation Concerns  car, none       Transition Planning    Patient/Family Anticipates Transition to  home with family    Patient/Family Anticipated Services at Transition  none    Transportation Anticipated  family or friend will provide       Discharge Needs Assessment    Concerns to be Addressed  denies needs/concerns at this time    Equipment Currently Used at Home  none    Equipment Needed After Discharge  none    Provided post acute provider list?  N/A    N/A Provider List Comment  Home at dc        Discharge Plan     Row Name 02/09/20 1143       Plan    Plan  Home    Patient/Family in Agreement with Plan  yes    Plan Comments  Spoke with pt. at bedside. Resides in [facility] with his spouse. No DME and voices no needs. Plan is to dc to home with spouse. Spouse can transport. No issues with obtaining meds currently. Will cont. to follow.     Final Discharge Disposition Code  01 - home or self-care        Destination      Coordination has not been started for this encounter.      Durable Medical Equipment      Coordination has not been started for this encounter.      Dialysis/Infusion      Coordination has not been started for this encounter.      Home Medical Care      Coordination has not been started for this encounter.      Therapy      Coordination has not been started for this encounter.      Community Resources       Coordination has not been started for this encounter.          Demographic Summary    No documentation.       Functional Status     Row Name 02/09/20 1142       Functional Status    Usual Activity Tolerance  good       Functional Status, IADL    Medications  independent    Meal Preparation  independent    Housekeeping  independent    Laundry  independent    Shopping  independent        Psychosocial    No documentation.       Abuse/Neglect    No documentation.       Legal    No documentation.       Substance Abuse    No documentation.       Patient Forms    No documentation.           Megan Graves RN

## 2020-02-09 NOTE — PROGRESS NOTES
"   [facility] at [facility]    Inpatient Progress Note      Chief Complaint/Reason for service:    · Follow-up acute systolic heart failure         Subjective:       Patient up resting in chair.  Denies any chest pain or shortness of breath.  Notes continued improvement in his lower extremity edema.  Reports \"feeling good\".  Anxious to be discharged home.    Past medical, surgical, social and family history reviewed in the patient's electronic medical record.    Review of Systems:   Positive for lower extremity edema  Negative for exertional chest pain, dyspnea with exertion, palpitations    Problem List  Active Hospital Problems    Diagnosis  POA   • **Acute congestive heart failure (CMS/HCC) [I50.9]  Yes   • A-fib (CMS/HCC) [I48.91]  Yes   • HTN (hypertension) [I10]  Yes   • Depression [F32.9]  Yes   • New onset of congestive heart failure (CMS/HCC) [I50.9]  Yes      Resolved Hospital Problems   No resolved problems to display.            Objective:      Current Facility-Administered Medications:   •  acetaminophen (TYLENOL) tablet 650 mg, 650 mg, Oral, Q6H PRN, Jaylan Pollard MD  •  carvedilol (COREG) tablet 12.5 mg, 12.5 mg, Oral, BID With Meals, Nicole Shafer, APRN, 12.5 mg at 02/09/20 0857  •  DULoxetine (CYMBALTA) DR capsule 30 mg, 30 mg, Oral, Daily, Unique Villa APRN, 30 mg at 02/09/20 0856  •  enoxaparin (LOVENOX) syringe 40 mg, 40 mg, Subcutaneous, Q24H, Unique Villa APRN, 40 mg at 02/08/20 2111  •  famotidine (PEPCID) tablet 20 mg, 20 mg, Oral, BID PRN, Jaylan Pollard MD  •  furosemide (LASIX) injection 40 mg, 40 mg, Intravenous, BID, Nicole Shafer APRN, 40 mg at 02/09/20 0857  •  melatonin tablet 5 mg, 5 mg, Oral, Nightly PRN, Jaylan Pollard MD  •  multivitamin with minerals 1 tablet, 1 tablet, Oral, Daily, Unique Villa APRN, 1 tablet at 02/09/20 0856  •  ondansetron (ZOFRAN) injection 4 mg, 4 mg, Intravenous, Q6H PRN, " Jaylan Pollard MD  •  Pharmacy Consult - [facility], , Does not apply, Daily, Jenny Alexander, [facility]  •  potassium chloride (MICRO-K) CR capsule 40 mEq, 40 mEq, Oral, PRN, 40 mEq at 02/08/20 0645 **OR** potassium chloride (KLOR-CON) packet 40 mEq, 40 mEq, Oral, PRN **OR** potassium chloride 10 mEq in 100 mL IVPB, 10 mEq, Intravenous, Q1H PRN, Unique Villa, APRN  •  [START ON 2/10/2020] sacubitril-valsartan (ENTRESTO) 24-26 MG tablet 1 tablet, 1 tablet, Oral, Q12H, Ron Jacobs MD  •  sodium chloride 0.9 % flush 10 mL, 10 mL, Intravenous, PRN, Villa, Unique W, APRN  •  sodium chloride 0.9 % flush 10 mL, 10 mL, Intravenous, Q12H, Unique Villa, APRN, 10 mL at 02/09/20 0857  •  sodium chloride 0.9 % flush 10 mL, 10 mL, Intravenous, PRN, Villa, Unique W, APRN  •  vitamin C (ASCORBIC ACID) tablet 250 mg, 250 mg, Oral, Daily, Unique Villa W, APRN, 250 mg at 02/09/20 0856  •  vitamin E capsule 100 Units, 100 Units, Oral, Daily, Unique Villa W, APRN, 100 Units at 02/09/20 0856    Vital Sign Min/Max for last 24 hours  Temp  Min: 97.9 °F (36.6 °C)  Max: 98.9 °F (37.2 °C)   BP  Min: 105/96  Max: 120/82   Pulse  Min: 74  Max: 104   Resp  Min: 16  Max: 18   SpO2  Min: 95 %  Max: 98 %   No data recorded      Intake/Output Summary (Last 24 hours) at 2/9/2020 0937  Last data filed at 2/8/2020 1840  Gross per 24 hour   Intake 720 ml   Output --   Net 720 ml           CONSTITUTIONAL: No acute distress  RESPIRATORY: Normal effort. Clear to auscultation bilaterally without wheezing or rales.  CARDIOVASCULAR: Regular rate and rhythm with normal S1 and S2. Without murmur, gallop or rub. Normal radial pulses bilaterally. There is mild lower extremity edema bilaterally.  PSYCH: Normal affect    Results Review:   Lab Results   Component Value Date    TROPONINT 0.012 02/08/2020       BUN   Date Value Ref Range Status   02/09/2020 14 8 - 23 mg/dL Final     Creatinine   Date Value Ref Range Status    02/09/2020 0.91 0.76 - 1.27 mg/dL Final     Potassium   Date Value Ref Range Status   02/09/2020 3.9 3.5 - 5.2 mmol/L Final     ALT (SGPT)   Date Value Ref Range Status   02/07/2020 80 (H) 1 - 41 U/L Final     AST (SGOT)   Date Value Ref Range Status   02/07/2020 49 (H) 1 - 40 U/L Final         Tele: Normal sinus rhythm    TTE 2/8/2020  · Left ventricular systolic function is moderately decreased. Estimated EF appears to be in the range of 31 - 35%.  · Left ventricular wall thickness is consistent with mild-to-moderate concentric hypertrophy.  · Right ventricular cavity is borderline dilated.  · Mildly reduced right ventricular systolic function noted.  · Left atrial volume is moderately increased.  · Right atrial cavity size is mildly dilated.  · Mild-to-moderate mitral valve regurgitation is present  · Mild to moderate tricuspid valve regurgitation is present.  · Estimated right ventricular systolic pressure from tricuspid regurgitation is mildly elevated (35-45 mmHg).  · Mild dilation of the sinuses of Valsalva is present.         Assessment/Plan:     ASSESSMENT:  1. Acute systolic heart failure, EF noted to be 31 to 35% per TTE 2/8/2020.  Symptoms improving with Lasix.  Could possibly be related to alcohol use.  Has been on beta-blocker and lisinopril.  Will transition from lisinopril to Entresto.    2. Frequent ectopy, will monitor for arrhythmia such as atrial fibrillation especially in light of his alcohol consumption.  3. Chest pain, troponin x3 unremarkable, EKG without acute ischemic changes.  No known history of CAD.  Reportedly normal cardiac catheterization in 2005.  4. Prior lower extremity discomfort, underwent arterial and venous duplexes around 2005.  5. Hypertension, stable  6. Peripheral neuropathy  7. Alcohol abuse, at risk for withdrawal.    PLAN:  1. Okay for discharge from a cardiac standpoint after fitted for LifeVest.  2. Will need repeat transthoracic echocardiogram to assess EF no sooner  than 5/8/2020.  3. Begin valsartan 40 mg p.o. daily.  Will consider transitioning to Entresto as an outpatient if not cost prohibitive.  4. Stop Lasix IVP and begin Lasix 40 mg p.o. daily.  5. Follow-up with the heart valve clinic on Friday with a BMP.  6. We will arrange for stress testing as an outpatient.  7. Follow-up with Dr. mansfield in the cardiology clinic in 8 weeks.    Nicole Shafer, DAVID  02/09/20 11:07 AM

## 2020-02-10 ENCOUNTER — DOCUMENTATION (OUTPATIENT)
Dept: CARDIAC REHAB | Facility: HOSPITAL | Age: 65
End: 2020-02-10

## 2020-02-10 ENCOUNTER — READMISSION MANAGEMENT (OUTPATIENT)
Dept: CALL CENTER | Facility: HOSPITAL | Age: 65
End: 2020-02-10

## 2020-02-10 DIAGNOSIS — I20.8 ANGINAL EQUIVALENT (HCC): Primary | ICD-10-CM

## 2020-02-10 NOTE — PROGRESS NOTES
Referral received for Phase II Cardiac Rehab.  Staff has reviewed chart and patient does not have a qualifying diagnosis for Phase II Cardiac Rehab at this time. Pt. Must be noted as chronic systolic heart failure to qualify. Staff available if further consultation is needed.

## 2020-02-10 NOTE — OUTREACH NOTE
Prep Survey      Responses   Facility patient discharged from?  [place]   Is patient eligible?  Yes   Discharge diagnosis  Acute CHF, Afib., HTN, Depression   Does the patient have one of the following disease processes/diagnoses(primary or secondary)?  CHF   Does the patient have Home health ordered?  No   Is there a DME ordered?  No   Comments regarding appointments  Pt to schedule F/U appointments   Prep survey completed?  Yes          Lyn Arreaga RN

## 2020-02-11 ENCOUNTER — READMISSION MANAGEMENT (OUTPATIENT)
Dept: CALL CENTER | Facility: HOSPITAL | Age: 65
End: 2020-02-11

## 2020-02-11 NOTE — OUTREACH NOTE
CHF Week 1 Survey      Responses   Facility patient discharged from?  [place]   Does the patient have one of the following disease processes/diagnoses(primary or secondary)?  CHF   Is there a successful TCM telephone encounter documented?  No   CHF Week 1 attempt successful?  No   Unsuccessful attempts  Attempt 1          Renea Marin RN

## 2020-02-12 ENCOUNTER — READMISSION MANAGEMENT (OUTPATIENT)
Dept: CALL CENTER | Facility: HOSPITAL | Age: 65
End: 2020-02-12

## 2020-02-12 NOTE — OUTREACH NOTE
CHF Week 1 Survey      Responses   Facility patient discharged from?  [place]   Does the patient have one of the following disease processes/diagnoses(primary or secondary)?  CHF   Is there a successful TCM telephone encounter documented?  No   CHF Week 1 attempt successful?  No   Unsuccessful attempts  Attempt 2          Marilyn Duong, RN

## 2020-02-14 ENCOUNTER — TELEPHONE (OUTPATIENT)
Dept: CARDIOLOGY | Facility: HOSPITAL | Age: 65
End: 2020-02-14

## 2020-02-14 ENCOUNTER — LAB (OUTPATIENT)
Dept: LAB | Facility: HOSPITAL | Age: 65
End: 2020-02-14

## 2020-02-14 ENCOUNTER — OFFICE VISIT (OUTPATIENT)
Dept: CARDIOLOGY | Facility: HOSPITAL | Age: 65
End: 2020-02-14

## 2020-02-14 VITALS
TEMPERATURE: 96.7 F | BODY MASS INDEX: 26.97 KG/M2 | HEIGHT: 73 IN | DIASTOLIC BLOOD PRESSURE: 67 MMHG | WEIGHT: 203.5 LBS | HEART RATE: 83 BPM | OXYGEN SATURATION: 99 % | RESPIRATION RATE: 18 BRPM | SYSTOLIC BLOOD PRESSURE: 97 MMHG

## 2020-02-14 DIAGNOSIS — I50.23 ACUTE ON CHRONIC SYSTOLIC CONGESTIVE HEART FAILURE (HCC): ICD-10-CM

## 2020-02-14 DIAGNOSIS — I50.23 ACUTE ON CHRONIC SYSTOLIC CONGESTIVE HEART FAILURE (HCC): Primary | ICD-10-CM

## 2020-02-14 DIAGNOSIS — E87.5 HYPERKALEMIA: Primary | ICD-10-CM

## 2020-02-14 DIAGNOSIS — Z87.898 HISTORY OF ALCOHOL USE: ICD-10-CM

## 2020-02-14 DIAGNOSIS — I10 ESSENTIAL HYPERTENSION: ICD-10-CM

## 2020-02-14 LAB
ANION GAP SERPL CALCULATED.3IONS-SCNC: 12 MMOL/L (ref 5–15)
BUN BLD-MCNC: 24 MG/DL (ref 8–23)
BUN/CREAT SERPL: 22 (ref 7–25)
CALCIUM SPEC-SCNC: 10.2 MG/DL (ref 8.6–10.5)
CHLORIDE SERPL-SCNC: 99 MMOL/L (ref 98–107)
CO2 SERPL-SCNC: 30 MMOL/L (ref 22–29)
CREAT BLD-MCNC: 1.09 MG/DL (ref 0.76–1.27)
GFR SERPL CREATININE-BSD FRML MDRD: 69 ML/MIN/1.73
GLUCOSE BLD-MCNC: 126 MG/DL (ref 65–99)
NT-PROBNP SERPL-MCNC: 2040 PG/ML (ref 5–900)
POTASSIUM BLD-SCNC: 5.3 MMOL/L (ref 3.5–5.2)
SODIUM BLD-SCNC: 141 MMOL/L (ref 136–145)

## 2020-02-14 PROCEDURE — 99214 OFFICE O/P EST MOD 30 MIN: CPT | Performed by: NURSE PRACTITIONER

## 2020-02-14 PROCEDURE — 36415 COLL VENOUS BLD VENIPUNCTURE: CPT

## 2020-02-14 PROCEDURE — 83880 ASSAY OF NATRIURETIC PEPTIDE: CPT

## 2020-02-14 PROCEDURE — 80048 BASIC METABOLIC PNL TOTAL CA: CPT

## 2020-02-14 RX ORDER — TRIAMTERENE AND HYDROCHLOROTHIAZIDE 37.5; 25 MG/1; MG/1
1 CAPSULE ORAL EVERY MORNING
COMMUNITY
End: 2020-02-14

## 2020-02-14 NOTE — TELEPHONE ENCOUNTER
Called and reviewed labs    Results for orders placed or performed in visit on 02/14/20   Basic Metabolic Panel   Result Value Ref Range    Glucose 126 (H) 65 - 99 mg/dL    BUN 24 (H) 8 - 23 mg/dL    Creatinine 1.09 0.76 - 1.27 mg/dL    Sodium 141 136 - 145 mmol/L    Potassium 5.3 (H) 3.5 - 5.2 mmol/L    Chloride 99 98 - 107 mmol/L    CO2 30.0 (H) 22.0 - 29.0 mmol/L    Calcium 10.2 8.6 - 10.5 mg/dL    eGFR Non African Amer 69 >60 mL/min/1.73    BUN/Creatinine Ratio 22.0 7.0 - 25.0    Anion Gap 12.0 5.0 - 15.0 mmol/L   proBNP   Result Value Ref Range    proBNP 2,040.0 (H) 5.0 - 900.0 pg/mL     Pt will stop Dyazide and repeat BMP in 1 week

## 2020-02-14 NOTE — PROGRESS NOTES
[facility]  [facility]      Encounter Date:02/14/2020     Hardeep James  [address] [place] [address]  [unfilled]    1955    Clara Bajwa APRN    Hardeep James is a 64 y.o. male.      Subjective:     Chief Complaint:  Establish Care (heart failure.  hospital f/u)       HPI     64-year-old male with a history of hypertension presented to [facility] on 2/7/2020 with acute systolic heart failure EF 31 to 35%.  Patient started on carvedilol, valsartan.  Discharged on Lasix.  Plan for outpatient stress testing.  LifeVest ordered at discharge.  Patient has a history of alcohol use.  Patient noted to have frequent ectopy, PACs.  Questionable atrial fibrillation while in the ED, not noted during hospital stay.    Denies CP, pressure, dyspnea, dizziness, near syncope, edema.  Been back, to work with not problems.  Worked 40 hours this week.      Home blood pressure 90s to 1 teens without dizziness, syncope, near syncope.  Patient refused to wear LifeVest.  Tells me today that he has not drank any alcohol since discharge.  No plans to drink alcohol in the future    Patient Active Problem List    Diagnosis Date Noted   • Cardiomyopathy (CMS/[facility]) 02/09/2020   • Atrial ectopy 02/09/2020   • Acute systolic congestive heart failure (CMS/[facility]) 02/07/2020   • HTN (hypertension) 02/07/2020   • Depression 02/07/2020   • New onset of congestive heart failure (CMS/[facility]) 02/07/2020         Past Surgical History:   Procedure Laterality Date   • HUMERUS FRACTURE SURGERY  04/1992       No Known Allergies      Current Outpatient Medications:   •  aspirin 81 MG EC tablet, Take 1 tablet by mouth Daily., Disp: 90 tablet, Rfl: 1  •  carvedilol (COREG) 12.5 MG tablet, Take 1 tablet by mouth 2 (Two) Times a Day With Meals., Disp: 60 tablet, Rfl: 5  •  DULoxetine (CYMBALTA) 30 MG capsule, Take 30 mg by mouth Daily., Disp: , Rfl:   •  furosemide (LASIX) 40 MG tablet, Take 1 tablet by mouth Daily.,  "Disp: 30 tablet, Rfl: 5  •  Multiple Vitamins-Minerals (CENTRUM ADULTS PO), Take 1 tablet by mouth Daily., Disp: , Rfl:   •  triamterene-hydrochlorothiazide (DYAZIDE) 37.5-25 MG per capsule, Take 1 capsule by mouth Every Morning., Disp: , Rfl:   •  valsartan (DIOVAN) 40 MG tablet, Take 1 tablet by mouth Daily., Disp: 60 tablet, Rfl: 5  •  vitamin C (ASCORBIC ACID) 250 MG tablet, Take 250 mg by mouth Daily., Disp: , Rfl:   •  vitamin E 100 UNIT capsule, Take 100 Units by mouth Daily., Disp: , Rfl:   •  acetaminophen (TYLENOL) 325 MG tablet, Take 2 tablets by mouth Every 6 (Six) Hours As Needed for Mild Pain ., Disp: , Rfl:     The following portions of the patient's history were reviewed and updated today during office visit as appropriate: allergies, current medications, past family history, past medical history, past social history, past surgical history and problem list.    Review of Systems   All other systems reviewed and are negative.      Objective:     Vitals:    02/14/20 0925 02/14/20 0926 02/14/20 0927   BP: 101/71 100/67 97/67   BP Location: Right arm Right arm Left arm   Patient Position: Standing Sitting Sitting   Cuff Size: Adult Adult Adult   Pulse: 78  83   Resp:   18   Temp:   96.7 °F (35.9 °C)   TempSrc:   Temporal   SpO2: 98%  99%   Weight:   92.3 kg (203 lb 8 oz)   Height:   185.4 cm (73\")         Physical Exam   Constitutional: He is oriented to person, place, and time. He appears well-developed and well-nourished. No distress.   HENT:   Mouth/Throat: Oropharynx is clear and moist.   Eyes: No scleral icterus.   Neck: No hepatojugular reflux and no JVD present. Carotid bruit is not present. No tracheal deviation present. No thyromegaly present.   Cardiovascular: Normal rate, regular rhythm, normal heart sounds and intact distal pulses. Exam reveals no friction rub.   No murmur heard.  Pulmonary/Chest: Effort normal and breath sounds normal.   Abdominal: Soft. Bowel sounds are normal. He exhibits " no distension. There is no tenderness.   Musculoskeletal: He exhibits no edema.   Lymphadenopathy:     He has no cervical adenopathy.   Neurological: He is alert and oriented to person, place, and time.   Skin: Skin is warm, dry and intact. No rash noted. No cyanosis or erythema. No pallor.   Psychiatric: He has a normal mood and affect. His behavior is normal. Thought content normal.   Vitals reviewed.      Lab and Diagnostic Review:  Lab Results   Component Value Date    WBC 8.21 02/09/2020    HGB 14.6 02/09/2020    HCT 43.8 02/09/2020    .0 (H) 02/09/2020     02/09/2020     Lab Results   Component Value Date    GLUCOSE 132 (H) 02/09/2020    CALCIUM 8.8 02/09/2020     02/09/2020    K 3.9 02/09/2020    CO2 24.0 02/09/2020     02/09/2020    BUN 14 02/09/2020    CREATININE 0.91 02/09/2020    EGFRIFNONA 84 02/09/2020    BCR 15.4 02/09/2020    ANIONGAP 12.0 02/09/2020     Lab Results   Component Value Date    TSH 2.090 02/08/2020     Lab Results   Component Value Date    CHOL 123 02/08/2020     Lab Results   Component Value Date    TRIG 66 02/08/2020     Lab Results   Component Value Date    HDL 49 02/08/2020     Lab Results   Component Value Date    LDL 61 02/08/2020       Assessment and Plan:         1. Acute on chronic systolic congestive heart failure (CMS/[facility])  EF 31 to 35%  Euvolemic  Coreg, valsartan    - Basic Metabolic Panel; Future  - proBNP; Future    Heart failure education discussed: What is heart failure, causes, signs and symptoms, medication management, daily weight monitoring, low-sodium diet of less than 2 g per day, daily exercise, role the heart failure center.  Discussed indications for LifeVest, repeating echocardiogram, stress testing, ICD indications if needed    [facility] to schedule stress at f/u     2. Essential hypertension  Low normal, asymptomatic      Reviewed signs and symptoms of hypotension  3. History of alcohol use  Recent cessation.    F/u with [facility] as scheduled.   F/u [facility] 6 months or sooner if needed.         *Please note that portions of this note were completed with a voice recognition program. Efforts were made to edit the dictations, but occasionally words are mistranscribed.

## 2020-02-17 ENCOUNTER — TELEPHONE (OUTPATIENT)
Dept: CARDIOLOGY | Facility: HOSPITAL | Age: 65
End: 2020-02-17

## 2020-02-17 ENCOUNTER — READMISSION MANAGEMENT (OUTPATIENT)
Dept: CALL CENTER | Facility: HOSPITAL | Age: 65
End: 2020-02-17

## 2020-02-17 NOTE — OUTREACH NOTE
CHF Week 2 Survey      Responses   Facility patient discharged from?  [place]   Does the patient have one of the following disease processes/diagnoses(primary or secondary)?  CHF   Week 2 attempt successful?  No   Unsuccessful attempts  Attempt 1          Lyn Salgado RN

## 2020-02-17 NOTE — TELEPHONE ENCOUNTER
Patient's wife is calling, patient recalls being told to discontinue a medication but he can not remember which  Medication it is. please advise thank you.

## 2020-02-19 ENCOUNTER — READMISSION MANAGEMENT (OUTPATIENT)
Dept: CALL CENTER | Facility: HOSPITAL | Age: 65
End: 2020-02-19

## 2020-02-19 NOTE — OUTREACH NOTE
CHF Week 2 Survey      Responses   Facility patient discharged from?  [place]   Does the patient have one of the following disease processes/diagnoses(primary or secondary)?  CHF   Week 2 attempt successful?  Yes   Call start time  1005   Call end time  1022   Discharge diagnosis  Acute CHF, Afib., HTN, Depression   Is patient permission given to speak with other caregiver?  Yes   List who call center can speak with  wife   Meds reviewed with patient/caregiver?  Yes   Is the patient having any side effects they believe may be caused by any medication additions or changes?  No   Does the patient have all medications ordered at discharge?  Yes   Is the patient taking all medications as directed (includes completed medication regime)?  Yes   Medication comments  Pt reports that he is NOT taking DYazide, which APRN said to stop. He was confused bc he has not been taking this med. Advised him to clarify with DAVID Louis.    Has the patient kept scheduled appointments due by today?  Yes   Has all DME been delivered?  No   Psychosocial issues?  No   Comments  Pt is returned to work and did not get the LifeVest placed. LE edema is better, SOA is resolved. pt lost 25# since Dc. Limiting NA in diet. He is feeling better but very concerned about the cost of medical care, which is one reason he is not wearing LifeVest.    What is the patient's perception of their health status since discharge?  Returned to baseline/stable   Is the patient weighing daily?  Yes   Does the patient have scales?  Yes   Daily weight interventions  Education provided on importance of daily weight   Is the patient able to teach back Heart Failure diet management?  Yes   Is the patient able to teach back Heart Failure Zones?  Yes   Is the patient able to teach back signs and symptoms of worsening condition? (i.e. weight gain, shortness of air, etc.)  Yes   Is the patient/caregiver able to teach back the hierarchy of who to call/visit for  symptoms/problems? PCP, Specialist, Home health nurse, Urgent Care, ED, 911  Yes   CHF Week 2 call completed?  Yes          Marilyn Duong RN

## 2020-02-19 NOTE — TELEPHONE ENCOUNTER
Spoke with patient wife and she verbalized understanding and verified that she and  has an appt Friday and they will be going to get the labs drawn as well.

## 2020-02-26 ENCOUNTER — READMISSION MANAGEMENT (OUTPATIENT)
Dept: CALL CENTER | Facility: HOSPITAL | Age: 65
End: 2020-02-26

## 2020-02-26 NOTE — OUTREACH NOTE
CHF Week 3 Survey      Responses   Facility patient discharged from?  [place]   Does the patient have one of the following disease processes/diagnoses(primary or secondary)?  CHF   Week 3 attempt successful?  Yes   Call start time  1656   Call end time  1700   Discharge diagnosis  Acute CHF, Afib., HTN, Depression   Meds reviewed with patient/caregiver?  Yes   Is the patient having any side effects they believe may be caused by any medication additions or changes?  No   Does the patient have all medications ordered at discharge?  Yes   Is the patient taking all medications as directed (includes completed medication regime)?  Yes   Does the patient have a primary care provider?   Yes   Has the patient kept scheduled appointments due by today?  Yes   Has all DME been delivered?  No   Psychosocial issues?  No   Did the patient receive a copy of their discharge instructions?  Yes   Nursing interventions  Reviewed instructions with patient   What is the patient's perception of their health status since discharge?  Improving   Nursing interventions  Nurse provided patient education   Is the patient weighing daily?  Yes   Does the patient have scales?  Yes   Daily weight interventions  Education provided on importance of daily weight   Is the patient able to teach back Heart Failure diet management?  Yes   Is the patient able to teach back Heart Failure Zones?  Yes   Is the patient able to teach back signs and symptoms of worsening condition? (i.e. weight gain, shortness of air, etc.)  Yes   Is the patient/caregiver able to teach back the hierarchy of who to call/visit for symptoms/problems? PCP, Specialist, Home health nurse, Urgent Care, ED, 911  Yes   Additional teach back comments  Encouraged f/u appt for labs (BMP), he is feeling better   CHF Week 3 call completed?  Yes          Zeny Valle RN

## 2020-03-04 ENCOUNTER — READMISSION MANAGEMENT (OUTPATIENT)
Dept: CALL CENTER | Facility: HOSPITAL | Age: 65
End: 2020-03-04

## 2020-03-04 NOTE — OUTREACH NOTE
CHF Week 4 Survey      Responses   [facility] patient discharged from?  [place]   Does the patient have one of the following disease processes/diagnoses(primary or secondary)?  CHF   Week 4 attempt successful?  No          Renea Marin RN

## 2020-03-20 ENCOUNTER — HOSPITAL ENCOUNTER (OUTPATIENT)
Dept: CARDIOLOGY | Facility: HOSPITAL | Age: 65
Discharge: HOME | End: 2020-03-20

## 2020-03-20 ENCOUNTER — HOSPITAL ENCOUNTER (OUTPATIENT)
Dept: CARDIOLOGY | Facility: HOSPITAL | Age: 65
Discharge: HOME | End: 2020-03-20
Admitting: NURSE PRACTITIONER

## 2020-03-20 VITALS
BODY MASS INDEX: 26.51 KG/M2 | SYSTOLIC BLOOD PRESSURE: 130 MMHG | HEART RATE: 56 BPM | WEIGHT: 200 LBS | HEIGHT: 73 IN | DIASTOLIC BLOOD PRESSURE: 90 MMHG

## 2020-03-20 DIAGNOSIS — I20.8 ANGINAL EQUIVALENT (HCC): ICD-10-CM

## 2020-03-20 LAB
BH CV STRESS BP STAGE 1: NORMAL
BH CV STRESS BP STAGE 2: NORMAL
BH CV STRESS BP STAGE 3: NORMAL
BH CV STRESS DURATION MIN STAGE 1: 3
BH CV STRESS DURATION MIN STAGE 2: 3
BH CV STRESS DURATION MIN STAGE 3: 2
BH CV STRESS DURATION SEC STAGE 1: 0
BH CV STRESS DURATION SEC STAGE 2: 0
BH CV STRESS DURATION SEC STAGE 3: 30
BH CV STRESS GRADE STAGE 1: 10
BH CV STRESS GRADE STAGE 2: 12
BH CV STRESS GRADE STAGE 3: 14
BH CV STRESS HR STAGE 1: 98
BH CV STRESS HR STAGE 2: 134
BH CV STRESS HR STAGE 3: 164
BH CV STRESS METS STAGE 1: 5
BH CV STRESS METS STAGE 2: 7.5
BH CV STRESS METS STAGE 3: 10
BH CV STRESS PROTOCOL 1: NORMAL
BH CV STRESS RECOVERY BP: NORMAL MMHG
BH CV STRESS RECOVERY HR: 92 BPM
BH CV STRESS SPEED STAGE 1: 1.7
BH CV STRESS SPEED STAGE 2: 2.5
BH CV STRESS SPEED STAGE 3: 3.4
BH CV STRESS STAGE 1: 1
BH CV STRESS STAGE 2: 2
BH CV STRESS STAGE 3: 3
LV EF NUC BP: 36 %
MAXIMAL PREDICTED HEART RATE: 158 BPM
PERCENT MAX PREDICTED HR: 103.8 %
STRESS BASELINE BP: NORMAL MMHG
STRESS BASELINE HR: 60 BPM
STRESS PERCENT HR: 122 %
STRESS POST ESTIMATED WORKLOAD: 10.4 METS
STRESS POST EXERCISE DUR MIN: 8 MIN
STRESS POST EXERCISE DUR SEC: 30 SEC
STRESS POST PEAK BP: NORMAL MMHG
STRESS POST PEAK HR: 164 BPM
STRESS TARGET HR: 134 BPM

## 2020-03-20 PROCEDURE — 78452 HT MUSCLE IMAGE SPECT MULT: CPT | Performed by: INTERNAL MEDICINE

## 2020-03-20 PROCEDURE — 0 TECHNETIUM SESTAMIBI: Performed by: NURSE PRACTITIONER

## 2020-03-20 PROCEDURE — 78452 HT MUSCLE IMAGE SPECT MULT: CPT

## 2020-03-20 PROCEDURE — A9500 TC99M SESTAMIBI: HCPCS | Performed by: NURSE PRACTITIONER

## 2020-03-20 PROCEDURE — 93018 CV STRESS TEST I&R ONLY: CPT | Performed by: INTERNAL MEDICINE

## 2020-03-20 PROCEDURE — 93017 CV STRESS TEST TRACING ONLY: CPT

## 2020-03-20 RX ADMIN — TECHNETIUM TC 99M SESTAMIBI 1 DOSE: 1 INJECTION INTRAVENOUS at 12:45

## 2020-03-20 RX ADMIN — TECHNETIUM TC 99M SESTAMIBI 1 DOSE: 1 INJECTION INTRAVENOUS at 10:30

## 2020-03-24 ENCOUNTER — TELEPHONE (OUTPATIENT)
Dept: CARDIOLOGY | Facility: CLINIC | Age: 65
End: 2020-03-24

## 2020-03-24 DIAGNOSIS — I50.21 ACUTE SYSTOLIC CONGESTIVE HEART FAILURE (HCC): Primary | ICD-10-CM

## 2020-03-24 NOTE — TELEPHONE ENCOUNTER
----- Message from Ron Jacobs MD sent at 3/23/2020  3:05 PM EDT -----  Please let the patient know that his stress test was negative for evidence of blockages in his heart arteries but his heart pump is still weak.  He needs to have a repeat echocardiogram on May 8, 2020 or later to assess whether or not his heart pump is improved on medications.  If he has been feeling okay, please have him change his appointment to sometime on May 8 or later and have him undergo a limited echocardiogram for EF on the same day.

## 2020-03-24 NOTE — TELEPHONE ENCOUNTER
Patient contacted to review stress test results and recommendations per [facility]. Pt states that he would like to push his appt out to May to be scheduled with his limited echo. Will have scheduling set up follow up appt.

## 2020-06-15 ENCOUNTER — HOSPITAL ENCOUNTER (OUTPATIENT)
Dept: CARDIOLOGY | Facility: HOSPITAL | Age: 65
Discharge: HOME | End: 2020-06-15
Admitting: INTERNAL MEDICINE

## 2020-06-15 ENCOUNTER — OFFICE VISIT (OUTPATIENT)
Dept: CARDIOLOGY | Facility: CLINIC | Age: 65
End: 2020-06-15

## 2020-06-15 VITALS
WEIGHT: 213 LBS | HEIGHT: 73 IN | OXYGEN SATURATION: 100 % | SYSTOLIC BLOOD PRESSURE: 128 MMHG | BODY MASS INDEX: 28.23 KG/M2 | DIASTOLIC BLOOD PRESSURE: 82 MMHG | HEART RATE: 60 BPM

## 2020-06-15 DIAGNOSIS — I50.22 CHRONIC SYSTOLIC CONGESTIVE HEART FAILURE (HCC): Primary | ICD-10-CM

## 2020-06-15 DIAGNOSIS — I10 ESSENTIAL HYPERTENSION: ICD-10-CM

## 2020-06-15 DIAGNOSIS — I49.1 ATRIAL ECTOPY: ICD-10-CM

## 2020-06-15 DIAGNOSIS — I50.9 NEW ONSET OF CONGESTIVE HEART FAILURE (HCC): ICD-10-CM

## 2020-06-15 DIAGNOSIS — I50.21 ACUTE SYSTOLIC CONGESTIVE HEART FAILURE (HCC): ICD-10-CM

## 2020-06-15 PROCEDURE — 93000 ELECTROCARDIOGRAM COMPLETE: CPT | Performed by: INTERNAL MEDICINE

## 2020-06-15 PROCEDURE — 93325 DOPPLER ECHO COLOR FLOW MAPG: CPT | Performed by: INTERNAL MEDICINE

## 2020-06-15 PROCEDURE — 99214 OFFICE O/P EST MOD 30 MIN: CPT | Performed by: INTERNAL MEDICINE

## 2020-06-15 PROCEDURE — 93321 DOPPLER ECHO F-UP/LMTD STD: CPT | Performed by: INTERNAL MEDICINE

## 2020-06-15 PROCEDURE — 93325 DOPPLER ECHO COLOR FLOW MAPG: CPT

## 2020-06-15 PROCEDURE — 93321 DOPPLER ECHO F-UP/LMTD STD: CPT

## 2020-06-15 PROCEDURE — 93308 TTE F-UP OR LMTD: CPT

## 2020-06-15 PROCEDURE — 93308 TTE F-UP OR LMTD: CPT | Performed by: INTERNAL MEDICINE

## 2020-06-15 RX ORDER — CARVEDILOL 6.25 MG/1
6.25 TABLET ORAL 2 TIMES DAILY WITH MEALS
Qty: 60 TABLET | Refills: 11 | Status: SHIPPED | OUTPATIENT
Start: 2020-06-15 | End: 2021-06-21 | Stop reason: SDUPTHER

## 2020-06-15 NOTE — PROGRESS NOTES
[facility] AT [address], [address]  [place], [place], [address] [phone]  [facility]           OUTPATIENT CLINIC FOLLOW UP NOTE    Patient Care Team:  Patient Care Team:  Clara Bajwa APRN as PCP - General (Nurse Practitioner)    Subjective:      Chief Complaint   Patient presents with   • Acute on chronic systolic congestive heart failure     f/u       HPI:    Hardeep James is a 64 y.o. male.  Cardiac problem list:  1. Chronic systolic heart failure  a. Diagnosed 2/2020, EF 30 to 35%  b. Repeat echocardiogram after medical optimization 6/2020, EF improvement to 45 to 40%  2. Frequent ectopy  3. Chest pain syndrome  a. Stress test negative for ischemia 3/2020  4. Lower extremity discomfort  5. Hypertension  6. Peripheral neuropathy  7. Alcohol abuse    Today the patient presents for follow-up.    Patient is doing well from a cardiac standpoint.  Intermittent low blood pressure.  Has been holding 1 of his doses of Coreg most days.  Otherwise denies chest pain, dyspnea, palpitations, lower extremity swelling    Review of Systems:  Positive for occasional hypotension  Negative for exertional chest pain, dyspnea with exertion, orthopnea, PND, lower extremity edema, palpitations, lightheadedness, syncope.     PFSH:  Patient Active Problem List   Diagnosis   • Chronic systolic congestive heart failure (CMS/HCC)   • Essential hypertension   • Depression   • New onset of congestive heart failure (CMS/HCC)   • Cardiomyopathy (CMS/HCC)   • Atrial ectopy         Current Outpatient Medications:   •  acetaminophen (TYLENOL) 325 MG tablet, Take 2 tablets by mouth Every 6 (Six) Hours As Needed for Mild Pain ., Disp: , Rfl:   •  aspirin 81 MG EC tablet, Take 1 tablet by mouth Daily., Disp: 90 tablet, Rfl: 1  •  carvedilol (COREG) 12.5 MG tablet, Take 1 tablet by mouth 2 (Two) Times a Day With Meals., Disp: 60 tablet, Rfl: 5  •  DULoxetine (CYMBALTA) 30 MG capsule, Take  "30 mg by mouth Daily., Disp: , Rfl:   •  furosemide (LASIX) 40 MG tablet, Take 1 tablet by mouth Daily., Disp: 30 tablet, Rfl: 5  •  Multiple Vitamins-Minerals (CENTRUM ADULTS PO), Take 1 tablet by mouth Daily., Disp: , Rfl:   •  valsartan (DIOVAN) 40 MG tablet, Take 1 tablet by mouth Daily., Disp: 60 tablet, Rfl: 5  •  vitamin C (ASCORBIC ACID) 250 MG tablet, Take 250 mg by mouth Daily., Disp: , Rfl:   •  vitamin E 100 UNIT capsule, Take 100 Units by mouth Daily., Disp: , Rfl:     No Known Allergies     reports that he quit smoking about 4 months ago. He has never used smokeless tobacco.    Family History   Problem Relation Age of Onset   • Heart disease Mother    • Cancer Mother    • Emphysema Mother    • COPD Mother    • Cancer Father    • Lung cancer Father          Objective:   Physical exam:  /82 (BP Location: Left arm, Patient Position: Sitting)   Pulse 60   Ht 185.4 cm (73\")   Wt 96.6 kg (213 lb)   SpO2 100%   BMI 28.10 kg/m²   CONSTITUTIONAL: No acute distress  RESPIRATORY: Normal effort. Clear to auscultation bilaterally without wheezing or rales  CARDIOVASCULAR: Carotids with normal upstrokes without bruits.  Regular rate and rhythm with normal S1 and S2. Without murmur, gallop or rub. Normal radial pulse. There is no lower extremity edema bilaterally.  PSYCH: Normal affect and mood    Labs:    BUN   Date Value Ref Range Status   02/14/2020 24 (H) 8 - 23 mg/dL Final     Creatinine   Date Value Ref Range Status   02/14/2020 1.09 0.76 - 1.27 mg/dL Final     Potassium   Date Value Ref Range Status   02/14/2020 5.3 (H) 3.5 - 5.2 mmol/L Final     ALT (SGPT)   Date Value Ref Range Status   02/07/2020 80 (H) 1 - 41 U/L Final     AST (SGOT)   Date Value Ref Range Status   02/07/2020 49 (H) 1 - 40 U/L Final       Lab Results   Component Value Date    CHOL 123 02/08/2020     Lab Results   Component Value Date    TRIG 66 02/08/2020     Lab Results   Component Value Date    HDL 49 02/08/2020     Lab " Results   Component Value Date    LDL 61 02/08/2020     No components found for: LDLDIRECTC    Diagnostic Data:      ECG 12 Lead  Date/Time: 6/15/2020 4:46 PM  Performed by: Ron Jacobs MD  Authorized by: Ron Jacobs MD   Comparison: compared with previous ECG from 2/8/2020  Similar to previous ECG  Rhythm: sinus rhythm  Ectopy: atrial premature contractions  Comments: Right bundle branch block, heart rate 67, , QTc 445            TTE 6/2028, images reviewed by myself, EF improved to 45 to 50%, full report to follow    TTE 2/2020  · Left ventricular systolic function is moderately decreased. Estimated EF appears to be in the range of 31 - 35%.  · Left ventricular wall thickness is consistent with mild-to-moderate concentric hypertrophy.  · Right ventricular cavity is borderline dilated.  · Mildly reduced right ventricular systolic function noted.  · Left atrial volume is moderately increased.  · Right atrial cavity size is mildly dilated.  · Mild-to-moderate mitral valve regurgitation is present  · Mild to moderate tricuspid valve regurgitation is present.  · Estimated right ventricular systolic pressure from tricuspid regurgitation is mildly elevated (35-45 mmHg).  · Mild dilation of the sinuses of Valsalva is present.    Stress test 3/2020  · Left ventricular ejection fraction is moderately reduced (Calculated EF = 36%).  · The exercise ECG portion of the stress test was negative for clinical and ECG evidence of myocardial ischemia. The Duke treadmill score was 8.5. There were frequent PACs in recovery including a few short runs of SVT. There were occasional PVCs during recovery.  · Diaphragmatic attenuation and GI artifacts are present.  · Myocardial perfusion imaging indicates a normal myocardial perfusion study with no evidence of ischemia.  · Impressions are consistent with a low risk study.    Assessment and Plan:   Haredep was seen today for acute on chronic systolic congestive heart  failure.    Diagnoses and all orders for this visit:    Chronic systolic congestive heart failure (CMS/HCC)  -Continue carvedilol, valsartan, Lasix  -Could not afford the LifeVest.  Never wore it  -Decrease carvedilol to 6.25 mg twice daily    Essential hypertension  -Changed to Coreg as noted above    Atrial ectopy  -Continue beta-blocker    - Return in about 6 months (around 12/15/2020) for Next scheduled follow up with Nicole Shafer.    Ron Jacobs MD, MSc, FACC

## 2020-06-16 LAB
BH CV ECHO MEAS - BSA(HAYCOCK): 2.2 M^2
BH CV ECHO MEAS - BSA: 2.1 M^2
BH CV ECHO MEAS - BZI_BMI: 27.1 KILOGRAMS/M^2
BH CV ECHO MEAS - BZI_METRIC_HEIGHT: 182.9 CM
BH CV ECHO MEAS - BZI_METRIC_WEIGHT: 90.7 KG
BH CV ECHO MEAS - EDV(CUBED): 89.3 ML
BH CV ECHO MEAS - EDV(MOD-SP2): 259 ML
BH CV ECHO MEAS - EDV(MOD-SP4): 260 ML
BH CV ECHO MEAS - EDV(TEICH): 91 ML
BH CV ECHO MEAS - EF(CUBED): 41 %
BH CV ECHO MEAS - EF(MOD-SP2): 46.7 %
BH CV ECHO MEAS - EF(MOD-SP4): 50 %
BH CV ECHO MEAS - EF(TEICH): 34 %
BH CV ECHO MEAS - EF{MOD-BP}: 49 %
BH CV ECHO MEAS - ESV(CUBED): 52.7 ML
BH CV ECHO MEAS - ESV(MOD-SP2): 138 ML
BH CV ECHO MEAS - ESV(MOD-SP4): 130 ML
BH CV ECHO MEAS - ESV(TEICH): 60 ML
BH CV ECHO MEAS - FS: 16.1 %
BH CV ECHO MEAS - IVS/LVPW: 1.5
BH CV ECHO MEAS - IVSD: 1.6 CM
BH CV ECHO MEAS - LA DIMENSION: 4.7 CM
BH CV ECHO MEAS - LV DIASTOLIC VOL/BSA (35-75): 122 ML/M^2
BH CV ECHO MEAS - LV MASS(C)D: 221.6 GRAMS
BH CV ECHO MEAS - LV MASS(C)DI: 104 GRAMS/M^2
BH CV ECHO MEAS - LV MAX PG: 7.1 MMHG
BH CV ECHO MEAS - LV MEAN PG: 3 MMHG
BH CV ECHO MEAS - LV SYSTOLIC VOL/BSA (12-30): 61 ML/M^2
BH CV ECHO MEAS - LV V1 MAX: 133 CM/SEC
BH CV ECHO MEAS - LV V1 MEAN: 77.6 CM/SEC
BH CV ECHO MEAS - LV V1 VTI: 31.1 CM
BH CV ECHO MEAS - LVIDD: 4.5 CM
BH CV ECHO MEAS - LVIDS: 3.8 CM
BH CV ECHO MEAS - LVLD AP2: 11.3 CM
BH CV ECHO MEAS - LVLD AP4: 11.6 CM
BH CV ECHO MEAS - LVLS AP2: 9.7 CM
BH CV ECHO MEAS - LVLS AP4: 9.6 CM
BH CV ECHO MEAS - LVPWD: 1 CM
BH CV ECHO MEAS - MV A MAX VEL: 92.8 CM/SEC
BH CV ECHO MEAS - MV E MAX VEL: 47.4 CM/SEC
BH CV ECHO MEAS - MV E/A: 0.51
BH CV ECHO MEAS - PA ACC SLOPE: 841 CM/SEC^2
BH CV ECHO MEAS - PA ACC TIME: 0.1 SEC
BH CV ECHO MEAS - PA PR(ACCEL): 33.1 MMHG
BH CV ECHO MEAS - SI(CUBED): 17.2 ML/M^2
BH CV ECHO MEAS - SI(MOD-SP2): 56.8 ML/M^2
BH CV ECHO MEAS - SI(MOD-SP4): 61 ML/M^2
BH CV ECHO MEAS - SI(TEICH): 14.5 ML/M^2
BH CV ECHO MEAS - SV(CUBED): 36.6 ML
BH CV ECHO MEAS - SV(MOD-SP2): 121 ML
BH CV ECHO MEAS - SV(MOD-SP4): 130 ML
BH CV ECHO MEAS - SV(TEICH): 31 ML
BH CV VAS BP LEFT ARM: NORMAL MMHG
LEFT ATRIUM VOLUME INDEX: 44 ML/M2
LV EF 2D ECHO EST: 50 %
MAXIMAL PREDICTED HEART RATE: 158 BPM
SINUS: 4.5 CM
STRESS TARGET HR: 134 BPM

## 2020-08-05 RX ORDER — CARVEDILOL 12.5 MG/1
TABLET ORAL
Qty: 180 TABLET | Refills: 1 | Status: SHIPPED | OUTPATIENT
Start: 2020-08-05 | End: 2020-08-28

## 2020-08-05 RX ORDER — FUROSEMIDE 40 MG/1
TABLET ORAL
Qty: 90 TABLET | Refills: 1 | Status: SHIPPED | OUTPATIENT
Start: 2020-08-05 | End: 2020-08-28 | Stop reason: SDUPTHER

## 2020-08-28 ENCOUNTER — OFFICE VISIT (OUTPATIENT)
Dept: CARDIOLOGY | Facility: HOSPITAL | Age: 65
End: 2020-08-28

## 2020-08-28 VITALS
HEART RATE: 85 BPM | SYSTOLIC BLOOD PRESSURE: 113 MMHG | OXYGEN SATURATION: 96 % | RESPIRATION RATE: 21 BRPM | HEIGHT: 73 IN | BODY MASS INDEX: 29.62 KG/M2 | DIASTOLIC BLOOD PRESSURE: 71 MMHG | WEIGHT: 223.5 LBS | TEMPERATURE: 97.9 F

## 2020-08-28 DIAGNOSIS — I49.1 ATRIAL ECTOPY: ICD-10-CM

## 2020-08-28 DIAGNOSIS — I10 ESSENTIAL HYPERTENSION: ICD-10-CM

## 2020-08-28 DIAGNOSIS — I50.22 CHRONIC SYSTOLIC CONGESTIVE HEART FAILURE (HCC): Primary | ICD-10-CM

## 2020-08-28 PROCEDURE — 99214 OFFICE O/P EST MOD 30 MIN: CPT | Performed by: NURSE PRACTITIONER

## 2020-08-28 RX ORDER — FUROSEMIDE 40 MG/1
20 TABLET ORAL DAILY
Qty: 90 TABLET | Refills: 1
Start: 2020-08-28 | End: 2021-02-08

## 2020-08-28 RX ORDER — ATORVASTATIN CALCIUM 10 MG/1
10 TABLET, FILM COATED ORAL DAILY
COMMUNITY
Start: 2020-08-21 | End: 2021-06-21

## 2020-08-28 NOTE — PROGRESS NOTES
[facility]      08/28/2020         Hardeep James  [address] [place] [address]  [unfilled]    1955    Clara Bajwa APRN    Hardeep James is a 65 y.o. male.      Subjective:     Chief Complaint:  Congestive Heart Failure and Follow-up       HPI   65-year-old male with history of chronic systolic heart failure, frequent ectopy, chronic chest pain, hypertension and alcohol abuse who presents today to follow-up on chronic systolic heart failure.  Patient had repeat echo in June which showed EF 50%.  He reports that he is doing great.  He has had no shortness of breath, orthopnea, PND, edema, palpitations, dizziness or lightheadedness.  He is drinking about 1-3 alcoholic drinks on most days of the week.  He also is drinking a 5-hour an energy drink a day.  He reports that he weighs himself daily and his weight remained stable.  He has had no CHF exacerbation since initially diagnosed in February.  He reports he had recent labs with his PCP couple days ago    Patient Active Problem List   Diagnosis   • Chronic systolic congestive heart failure (CMS/HCC)   • Essential hypertension   • Depression   • New onset of congestive heart failure (CMS/HCC)   • Cardiomyopathy (CMS/HCC)   • Atrial ectopy       Past Medical History:   Diagnosis Date   • Depression    • Hypertension    • Peripheral neuropathy        Past Surgical History:   Procedure Laterality Date   • HUMERUS FRACTURE SURGERY  04/1992       Family History   Problem Relation Age of Onset   • Heart disease Mother    • Cancer Mother    • Emphysema Mother    • COPD Mother    • Cancer Father    • Lung cancer Father        Social History     Socioeconomic History   • Marital status:      Spouse name: Not on file   • Number of children: Not on file   • Years of education: Not on file   • Highest education level: Not on file   Tobacco Use   • Smoking status: Former Smoker     Last attempt to quit: 1/17/2020      Years since quittin.6   • Smokeless tobacco: Never Used   Substance and Sexual Activity   • Alcohol use: Yes     Comment: fireball on the weekends   • Drug use: Never   • Sexual activity: Defer   Social History Narrative        Caffeine 5 hour energy       No Known Allergies      Current Outpatient Medications:   •  acetaminophen (TYLENOL) 325 MG tablet, Take 2 tablets by mouth Every 6 (Six) Hours As Needed for Mild Pain ., Disp: , Rfl:   •  aspirin 81 MG EC tablet, Take 1 tablet by mouth Daily., Disp: 90 tablet, Rfl: 1  •  atorvastatin (LIPITOR) 10 MG tablet, Take 10 mg by mouth Daily., Disp: , Rfl:   •  carvedilol (COREG) 6.25 MG tablet, Take 1 tablet by mouth 2 (Two) Times a Day With Meals., Disp: 60 tablet, Rfl: 11  •  DULoxetine (CYMBALTA) 30 MG capsule, Take 30 mg by mouth Daily., Disp: , Rfl:   •  furosemide (LASIX) 40 MG tablet, Take 0.5 tablets by mouth Daily. Take whole tablet as needed for 3lb wt gain in 24 hours, increased shortness of breath and/or swelling, Disp: 90 tablet, Rfl: 1  •  Multiple Vitamins-Minerals (CENTRUM ADULTS PO), Take 1 tablet by mouth Daily., Disp: , Rfl:   •  valsartan (DIOVAN) 40 MG tablet, Take 1 tablet by mouth Daily., Disp: 60 tablet, Rfl: 5  •  vitamin C (ASCORBIC ACID) 250 MG tablet, Take 250 mg by mouth Daily., Disp: , Rfl:   •  vitamin E 100 UNIT capsule, Take 100 Units by mouth Daily., Disp: , Rfl:     The following portions of the patient's history were reviewed today and updated as appropriate: allergies, current medications, past family history, past medical history, past social history, past surgical history and problem list     Review of Systems   Constitution: Negative for chills and fever.   HENT: Negative.    Eyes: Negative.    Cardiovascular: Negative for chest pain, claudication, cyanosis, dyspnea on exertion, irregular heartbeat, leg swelling, near-syncope, orthopnea, palpitations, paroxysmal nocturnal dyspnea and syncope.   Respiratory: Negative for cough,  "shortness of breath and snoring.    Endocrine: Negative.    Hematologic/Lymphatic: Does not bruise/bleed easily.   Skin: Negative for poor wound healing.   Musculoskeletal: Negative.    Gastrointestinal: Negative for abdominal pain, heartburn, hematemesis, melena, nausea and vomiting.   Genitourinary: Negative.  Negative for hematuria.   Neurological: Negative.    Psychiatric/Behavioral: Negative.    Allergic/Immunologic: Negative.        Objective:     Vitals:    08/28/20 1559   BP: 113/71   BP Location: Left arm   Patient Position: Sitting   Cuff Size: Adult   Pulse: 85   Resp: 21   Temp: 97.9 °F (36.6 °C)   TempSrc: Temporal   SpO2: 96%   Weight: 101 kg (223 lb 8 oz)   Height: 185.4 cm (73\")       Body mass index is 29.49 kg/m².    Physical Exam   Constitutional: He is oriented to person, place, and time. He appears well-developed and well-nourished. No distress.   HENT:   Head: Normocephalic.   Eyes: Pupils are equal, round, and reactive to light. Conjunctivae are normal.   Neck: Neck supple. No JVD present. No thyromegaly present.   Cardiovascular: Normal rate, regular rhythm, normal heart sounds and intact distal pulses.  Occasional extrasystoles are present. Exam reveals no gallop and no friction rub.   No murmur heard.  Pulmonary/Chest: Effort normal and breath sounds normal. No respiratory distress. He has no wheezes. He has no rales. He exhibits no tenderness.   Abdominal: Soft. Bowel sounds are normal.   Musculoskeletal: Normal range of motion. He exhibits no edema.   Neurological: He is alert and oriented to person, place, and time.   Skin: Skin is warm and dry.   Psychiatric: He has a normal mood and affect. His behavior is normal. Thought content normal.   Vitals reviewed.      Lab and Diagnostic Review:  Echo 6/15/20  · Left ventricular systolic function is low normal. Estimated EF = 50%.  · Left ventricular wall thickness is consistent with moderate septal asymmetric hypertrophy.  · Left ventricular " diastolic dysfunction (grade I) consistent with impaired relaxation.  · Left atrial cavity size is moderately dilated.  · Mild dilation of the sinuses of Valsalva is present.       Assessment and Plan:   1. Chronic systolic congestive heart failure (CMS/HCC)  EF is now up to 50%.  He has had no CHF exacerbations  I advised him he could try cutting his Lasix back to 20 mg daily and take 40 mg as needed for 3 pound weight gain 24 hours, increase shortness of breath or edema.  Continue valsartan and carvedilol  Continue low-sodium diet and daily weights    2. Atrial ectopy  I advised him that he is high risk for atrial fibrillation and recommended that he avoid all alcohol and caffeine.  He says he plans to do this    3. Essential hypertension  Well-controlled    Patient to continue to have close follow-up with   He requests to follow-up with us as needed due to financial constraints            It has been a pleasure to participate in the care of this patient.  Patient was instructed to call the [facility] with any questions, concerns, or worsening symptoms.    *Please note that portions of this note were completed with a voice recognition program. Efforts were made to edit the dictations, but occasionally words are mistranscribed.

## 2020-12-21 ENCOUNTER — OFFICE VISIT (OUTPATIENT)
Dept: CARDIOLOGY | Facility: CLINIC | Age: 65
End: 2020-12-21

## 2020-12-21 VITALS
HEART RATE: 77 BPM | BODY MASS INDEX: 31.14 KG/M2 | WEIGHT: 235 LBS | HEIGHT: 73 IN | OXYGEN SATURATION: 95 % | SYSTOLIC BLOOD PRESSURE: 140 MMHG | DIASTOLIC BLOOD PRESSURE: 88 MMHG

## 2020-12-21 DIAGNOSIS — I50.22 CHRONIC SYSTOLIC CONGESTIVE HEART FAILURE (HCC): Primary | ICD-10-CM

## 2020-12-21 DIAGNOSIS — I49.1 ATRIAL ECTOPY: ICD-10-CM

## 2020-12-21 DIAGNOSIS — I10 ESSENTIAL HYPERTENSION: ICD-10-CM

## 2020-12-21 PROCEDURE — 99213 OFFICE O/P EST LOW 20 MIN: CPT | Performed by: NURSE PRACTITIONER

## 2021-01-18 RX ORDER — VALSARTAN 40 MG/1
TABLET ORAL
Qty: 90 TABLET | Refills: 3 | Status: SHIPPED | OUTPATIENT
Start: 2021-01-18 | End: 2021-06-21 | Stop reason: SDUPTHER

## 2021-01-18 NOTE — TELEPHONE ENCOUNTER
Lab Results   Component Value Date    GLUCOSE 126 (H) 02/14/2020    CALCIUM 10.2 02/14/2020     02/14/2020    K 5.3 (H) 02/14/2020    CO2 30.0 (H) 02/14/2020    CL 99 02/14/2020    BUN 24 (H) 02/14/2020    CREATININE 1.09 02/14/2020    EGFRIFNONA 69 02/14/2020    BCR 22.0 02/14/2020    ANIONGAP 12.0 02/14/2020     Last office visit: 12/21/20

## 2021-02-08 RX ORDER — FUROSEMIDE 40 MG/1
TABLET ORAL
Qty: 90 TABLET | Refills: 1 | Status: SHIPPED | OUTPATIENT
Start: 2021-02-08 | End: 2021-08-09

## 2021-02-08 RX ORDER — CARVEDILOL 6.25 MG/1
6.25 TABLET ORAL 2 TIMES DAILY WITH MEALS
Qty: 180 TABLET | Refills: 1 | Status: SHIPPED | OUTPATIENT
Start: 2021-02-08 | End: 2021-06-21 | Stop reason: SDUPTHER

## 2021-06-21 ENCOUNTER — OFFICE VISIT (OUTPATIENT)
Dept: CARDIOLOGY | Facility: CLINIC | Age: 66
End: 2021-06-21

## 2021-06-21 VITALS
OXYGEN SATURATION: 97 % | BODY MASS INDEX: 31.94 KG/M2 | HEIGHT: 73 IN | DIASTOLIC BLOOD PRESSURE: 76 MMHG | WEIGHT: 241 LBS | HEART RATE: 87 BPM | SYSTOLIC BLOOD PRESSURE: 138 MMHG

## 2021-06-21 DIAGNOSIS — I49.1 ATRIAL ECTOPY: ICD-10-CM

## 2021-06-21 DIAGNOSIS — I50.22 CHRONIC SYSTOLIC CONGESTIVE HEART FAILURE (HCC): Primary | ICD-10-CM

## 2021-06-21 DIAGNOSIS — I10 ESSENTIAL HYPERTENSION: ICD-10-CM

## 2021-06-21 PROCEDURE — 99214 OFFICE O/P EST MOD 30 MIN: CPT | Performed by: INTERNAL MEDICINE

## 2021-06-21 RX ORDER — VALSARTAN 40 MG/1
40 TABLET ORAL DAILY
Qty: 90 TABLET | Refills: 3 | Status: SHIPPED | OUTPATIENT
Start: 2021-06-21 | End: 2022-08-05

## 2021-06-21 RX ORDER — CARVEDILOL 6.25 MG/1
6.25 TABLET ORAL 2 TIMES DAILY WITH MEALS
Qty: 180 TABLET | Refills: 3 | Status: SHIPPED | OUTPATIENT
Start: 2021-06-21 | End: 2021-08-09

## 2021-06-21 RX ORDER — TRIAMTERENE AND HYDROCHLOROTHIAZIDE 37.5; 25 MG/1; MG/1
1 TABLET ORAL DAILY
COMMUNITY
Start: 2021-06-19 | End: 2021-06-21

## 2021-06-21 RX ORDER — PRAVASTATIN SODIUM 10 MG
10 TABLET ORAL
COMMUNITY
Start: 2021-05-13

## 2021-06-21 NOTE — PROGRESS NOTES
[facility] AT [address], [address]  [place], [place], [address] [phone]  [facility]           OUTPATIENT CLINIC FOLLOW UP NOTE    Patient Care Team:  Patient Care Team:  Clara aBjwa APRN as PCP - General (Nurse Practitioner)    Subjective:      Chief Complaint   Patient presents with   • Chronic systolic congestive heart failure (CMS/HCC)       HPI:    Hardeep James is a 65 y.o. male.  Cardiac problem list:  1. Chronic systolic heart failure  a. Diagnosed 2/2020, EF 30 to 35%  b. Repeat echocardiogram after medical optimization 6/2020, EF improvement to 45 to 40%  2. Frequent ectopy  3. Chest pain syndrome  a. Stress test negative for ischemia 3/2020  4. Lower extremity discomfort  5. Hypertension  6. Peripheral neuropathy  7. Alcohol abuse    Today the patient presents for follow-up.    Since the patient was last seen he reports he has been doing well from a cardiac standpoint.      He denies any chest pain, shortness of breath, palpitations, or lightheadedness, syncope.        Review of Systems:  Negative for exertional chest pain, dyspnea with exertion, orthopnea, PND, lower extremity edema, palpitations, lightheadedness, syncope.     PFSH:  Patient Active Problem List   Diagnosis   • Chronic systolic congestive heart failure (CMS/HCC)   • Essential hypertension   • Depression   • New onset of congestive heart failure (CMS/HCC)   • Cardiomyopathy (CMS/HCC)   • Atrial ectopy         Current Outpatient Medications:   •  acetaminophen (TYLENOL) 325 MG tablet, Take 2 tablets by mouth Every 6 (Six) Hours As Needed for Mild Pain ., Disp: , Rfl:   •  aspirin 81 MG EC tablet, Take 1 tablet by mouth Daily., Disp: 90 tablet, Rfl: 1  •  carvedilol (COREG) 6.25 MG tablet, Take 1 tablet by mouth 2 (Two) Times a Day With Meals., Disp: 180 tablet, Rfl: 3  •  DULoxetine (CYMBALTA) 30 MG capsule, Take 30 mg by mouth Daily., Disp: , Rfl:   •  furosemide (LASIX) 40  "MG tablet, TAKE 1 TABLET BY MOUTH EVERY DAY, Disp: 90 tablet, Rfl: 1  •  Multiple Vitamins-Minerals (CENTRUM ADULTS PO), Take 1 tablet by mouth Daily., Disp: , Rfl:   •  pravastatin (PRAVACHOL) 10 MG tablet, Take 10 mg by mouth every night at bedtime., Disp: , Rfl:   •  valsartan (DIOVAN) 40 MG tablet, Take 1 tablet by mouth Daily., Disp: 90 tablet, Rfl: 3  •  vitamin C (ASCORBIC ACID) 250 MG tablet, Take 250 mg by mouth Daily., Disp: , Rfl:   •  vitamin E 100 UNIT capsule, Take 100 Units by mouth Daily., Disp: , Rfl:     No Known Allergies     reports that he quit smoking about 17 months ago. He has never used smokeless tobacco.    Family History   Problem Relation Age of Onset   • Heart disease Mother    • Cancer Mother    • Emphysema Mother    • COPD Mother    • Cancer Father    • Lung cancer Father          Objective:   Physical exam:  /76 (BP Location: Left arm, Patient Position: Sitting, Cuff Size: Adult)   Pulse 87   Ht 185.4 cm (73\")   Wt 109 kg (241 lb)   SpO2 97%   BMI 31.80 kg/m²   CONSTITUTIONAL: No acute distress  RESPIRATORY: Normal effort. Clear to auscultation bilaterally without wheezing or rales.  CARDIOVASCULAR: Regular rate and rhythm with normal S1 and S2. Without murmur, gallop or rub  PERIPHERAL VASCULAR: Normal radial pulse. There is no lower extremity edema bilaterally.   PSYCH: Normal affect and mood      Labs:    BUN   Date Value Ref Range Status   02/14/2020 24 (H) 8 - 23 mg/dL Final     Creatinine   Date Value Ref Range Status   02/14/2020 1.09 0.76 - 1.27 mg/dL Final     Potassium   Date Value Ref Range Status   02/14/2020 5.3 (H) 3.5 - 5.2 mmol/L Final     ALT (SGPT)   Date Value Ref Range Status   02/07/2020 80 (H) 1 - 41 U/L Final     AST (SGOT)   Date Value Ref Range Status   02/07/2020 49 (H) 1 - 40 U/L Final       Lab Results   Component Value Date    CHOL 123 02/08/2020     Lab Results   Component Value Date    TRIG 66 02/08/2020     Lab Results   Component Value Date "    HDL 49 02/08/2020     Lab Results   Component Value Date    LDL 61 02/08/2020     No components found for: LDLDIRECTC    Diagnostic Data:    Procedures    TTE 6/2028, images reviewed by myself, EF improved to 45 to 50%, full report to follow    TTE 2/2020  · Left ventricular systolic function is moderately decreased. Estimated EF appears to be in the range of 31 - 35%.  · Left ventricular wall thickness is consistent with mild-to-moderate concentric hypertrophy.  · Right ventricular cavity is borderline dilated.  · Mildly reduced right ventricular systolic function noted.  · Left atrial volume is moderately increased.  · Right atrial cavity size is mildly dilated.  · Mild-to-moderate mitral valve regurgitation is present  · Mild to moderate tricuspid valve regurgitation is present.  · Estimated right ventricular systolic pressure from tricuspid regurgitation is mildly elevated (35-45 mmHg).  · Mild dilation of the sinuses of Valsalva is present.    Stress test 3/2020  · Left ventricular ejection fraction is moderately reduced (Calculated EF = 36%).  · The exercise ECG portion of the stress test was negative for clinical and ECG evidence of myocardial ischemia. The Duke treadmill score was 8.5. There were frequent PACs in recovery including a few short runs of SVT. There were occasional PVCs during recovery.  · Diaphragmatic attenuation and GI artifacts are present.  · Myocardial perfusion imaging indicates a normal myocardial perfusion study with no evidence of ischemia.  · Impressions are consistent with a low risk study.    Assessment and Plan:   Hardeep was seen today for acute on chronic systolic congestive heart failure.    Diagnoses and all orders for this visit:    Chronic systolic congestive heart failure   -Euvolemic  -Continue Coreg, valsartan, and Lasix.    Essential hypertension  -Continue Coreg, valsartan    Atrial ectopy  -Continue beta-blocker    - Return for Next scheduled follow-up with an ECG  with Nicole Shafer.  - Patient did express potentially coming into the clinic even less than on an annual basis.  I defer to his judgment on his own clinical status since he has been doing well from a cardiac standpoint for a long time now    Ron Jacobs MD  06/21/21 15:22 EDT

## 2021-08-09 RX ORDER — FUROSEMIDE 40 MG/1
TABLET ORAL
Qty: 90 TABLET | Refills: 1 | Status: SHIPPED | OUTPATIENT
Start: 2021-08-09 | End: 2022-06-16 | Stop reason: SDUPTHER

## 2021-08-09 RX ORDER — CARVEDILOL 6.25 MG/1
TABLET ORAL
Qty: 180 TABLET | Refills: 3 | Status: SHIPPED | OUTPATIENT
Start: 2021-08-09

## 2022-06-16 RX ORDER — FUROSEMIDE 40 MG/1
40 TABLET ORAL DAILY
Qty: 90 TABLET | Refills: 0 | Status: SHIPPED | OUTPATIENT
Start: 2022-06-16

## 2022-08-05 RX ORDER — VALSARTAN 40 MG/1
TABLET ORAL
Qty: 90 TABLET | Refills: 3 | Status: SHIPPED | OUTPATIENT
Start: 2022-08-05

## 2022-09-22 NOTE — PROGRESS NOTES
[facility] AT [address], [address]  [place], [place], [address] [phone]  WWW.[facility]           OUTPATIENT CLINIC FOLLOW UP NOTE    Patient Care Team:  Patient Care Team:  Clara Bajwa APRN as PCP - General (Nurse Practitioner)    Subjective:      Chief Complaint   Patient presents with   • Chronic systolic congestive heart failure (CMS/HCC)       HPI:    Hardeep James is a 65 y.o. male.  Cardiac problem list:  1. Chronic systolic heart failure  a. Diagnosed 2/2020, EF 30 to 35%  b. Repeat echocardiogram after medical optimization 6/2020, EF improvement to 45 to 40%  2. Frequent ectopy  3. Chest pain syndrome  a. Stress test negative for ischemia 3/2020  4. Lower extremity discomfort  5. Hypertension  6. Peripheral neuropathy  7. Alcohol abuse    Today the patient presents for follow-up.    Since the patient was last seen he reports he has been doing well from a cardiac standpoint.  He denies any chest pain, shortness of breath, palpitations, or lightheadedness, syncope.  His blood pressure has been improved on a lower dose of Coreg.    Review of Systems:  Negative for exertional chest pain, dyspnea with exertion, orthopnea, PND, lower extremity edema, palpitations, lightheadedness, syncope.     PFSH:  Patient Active Problem List   Diagnosis   • Chronic systolic congestive heart failure (CMS/HCC)   • Essential hypertension   • Depression   • New onset of congestive heart failure (CMS/HCC)   • Cardiomyopathy (CMS/HCC)   • Atrial ectopy         Current Outpatient Medications:   •  acetaminophen (TYLENOL) 325 MG tablet, Take 2 tablets by mouth Every 6 (Six) Hours As Needed for Mild Pain ., Disp: , Rfl:   •  aspirin 81 MG EC tablet, Take 1 tablet by mouth Daily., Disp: 90 tablet, Rfl: 1  •  atorvastatin (LIPITOR) 10 MG tablet, Take 10 mg by mouth Daily., Disp: , Rfl:   •  carvedilol (COREG) 6.25 MG tablet, Take 1 tablet by mouth 2 (Two) Times a Day With  "Meals., Disp: 60 tablet, Rfl: 11  •  DULoxetine (CYMBALTA) 30 MG capsule, Take 30 mg by mouth Daily., Disp: , Rfl:   •  furosemide (LASIX) 40 MG tablet, Take 0.5 tablets by mouth Daily. Take whole tablet as needed for 3lb wt gain in 24 hours, increased shortness of breath and/or swelling, Disp: 90 tablet, Rfl: 1  •  Multiple Vitamins-Minerals (CENTRUM ADULTS PO), Take 1 tablet by mouth Daily., Disp: , Rfl:   •  valsartan (DIOVAN) 40 MG tablet, Take 1 tablet by mouth Daily., Disp: 60 tablet, Rfl: 5  •  vitamin C (ASCORBIC ACID) 250 MG tablet, Take 250 mg by mouth Daily., Disp: , Rfl:   •  vitamin E 100 UNIT capsule, Take 100 Units by mouth Daily., Disp: , Rfl:     No Known Allergies     reports that he quit smoking about 11 months ago. He has never used smokeless tobacco.    Family History   Problem Relation Age of Onset   • Heart disease Mother    • Cancer Mother    • Emphysema Mother    • COPD Mother    • Cancer Father    • Lung cancer Father          Objective:   Physical exam:  /88 (BP Location: Right arm, Patient Position: Sitting)   Pulse 77   Ht 185.4 cm (73\")   Wt 107 kg (235 lb)   SpO2 95%   BMI 31.00 kg/m²   CONSTITUTIONAL: No acute distress  RESPIRATORY: Normal effort. Clear to auscultation bilaterally without wheezing or rales.  CARDIOVASCULAR: Regular rate and rhythm with normal S1 and S2. Without murmur, gallop or rub.  Carotids with normal upstrokes and without bruits bilaterally.  PERIPHERAL VASCULAR: Normal radial pulse. There is no lower extremity edema bilaterally.  PSYCH: Normal affect and mood      Labs:    BUN   Date Value Ref Range Status   02/14/2020 24 (H) 8 - 23 mg/dL Final     Creatinine   Date Value Ref Range Status   02/14/2020 1.09 0.76 - 1.27 mg/dL Final     Potassium   Date Value Ref Range Status   02/14/2020 5.3 (H) 3.5 - 5.2 mmol/L Final     ALT (SGPT)   Date Value Ref Range Status   02/07/2020 80 (H) 1 - 41 U/L Final     AST (SGOT)   Date Value Ref Range Status "   02/07/2020 49 (H) 1 - 40 U/L Final       Lab Results   Component Value Date    CHOL 123 02/08/2020     Lab Results   Component Value Date    TRIG 66 02/08/2020     Lab Results   Component Value Date    HDL 49 02/08/2020     Lab Results   Component Value Date    LDL 61 02/08/2020     No components found for: LDLDIRECTC    Diagnostic Data:    Procedures    TTE 6/2028, images reviewed by myself, EF improved to 45 to 50%, full report to follow    TTE 2/2020  · Left ventricular systolic function is moderately decreased. Estimated EF appears to be in the range of 31 - 35%.  · Left ventricular wall thickness is consistent with mild-to-moderate concentric hypertrophy.  · Right ventricular cavity is borderline dilated.  · Mildly reduced right ventricular systolic function noted.  · Left atrial volume is moderately increased.  · Right atrial cavity size is mildly dilated.  · Mild-to-moderate mitral valve regurgitation is present  · Mild to moderate tricuspid valve regurgitation is present.  · Estimated right ventricular systolic pressure from tricuspid regurgitation is mildly elevated (35-45 mmHg).  · Mild dilation of the sinuses of Valsalva is present.    Stress test 3/2020  · Left ventricular ejection fraction is moderately reduced (Calculated EF = 36%).  · The exercise ECG portion of the stress test was negative for clinical and ECG evidence of myocardial ischemia. The Duke treadmill score was 8.5. There were frequent PACs in recovery including a few short runs of SVT. There were occasional PVCs during recovery.  · Diaphragmatic attenuation and GI artifacts are present.  · Myocardial perfusion imaging indicates a normal myocardial perfusion study with no evidence of ischemia.  · Impressions are consistent with a low risk study.    Assessment and Plan:   Hardeep was seen today for acute on chronic systolic congestive heart failure.    Diagnoses and all orders for this visit:    Chronic systolic congestive heart failure  (CMS/[facility])  -Euvolemic  -Tolerating Coreg at lower dose.  -Continue Coreg, valsartan, and Lasix.    Essential hypertension  -Elevated today, but at goal at home.  -Continue Coreg, valsartan    Atrial ectopy  -Continue beta-blocker    - Return in about 6 months (around 6/21/2021) for Next scheduled follow up with an EKG.    Nicole Shafer, APRN  12/21/20 10:50 EST     Consent: Written consent obtained. Risks include but not limited to lid/brow ptosis, bruising, swelling, diplopia, temporary effect, incomplete chemical denervation.

## 2024-10-15 ENCOUNTER — OFFICE VISIT (OUTPATIENT)
Dept: CARDIOLOGY | Facility: CLINIC | Age: 69
End: 2024-10-15
Payer: COMMERCIAL

## 2024-10-15 ENCOUNTER — TRANSCRIBE ORDERS (OUTPATIENT)
Dept: ADMINISTRATIVE | Facility: HOSPITAL | Age: 69
End: 2024-10-15
Payer: COMMERCIAL

## 2024-10-15 ENCOUNTER — HOSPITAL ENCOUNTER (OUTPATIENT)
Dept: CARDIOLOGY | Facility: HOSPITAL | Age: 69
Discharge: HOME | End: 2024-10-15
Payer: COMMERCIAL

## 2024-10-15 ENCOUNTER — HOSPITAL ENCOUNTER (OUTPATIENT)
Dept: GENERAL RADIOLOGY | Facility: HOSPITAL | Age: 69
Discharge: HOME | End: 2024-10-15
Payer: COMMERCIAL

## 2024-10-15 ENCOUNTER — TRANSCRIBE ORDERS (OUTPATIENT)
Dept: GENERAL RADIOLOGY | Facility: HOSPITAL | Age: 69
End: 2024-10-15
Payer: COMMERCIAL

## 2024-10-15 VITALS
HEART RATE: 104 BPM | DIASTOLIC BLOOD PRESSURE: 92 MMHG | BODY MASS INDEX: 30.85 KG/M2 | OXYGEN SATURATION: 96 % | SYSTOLIC BLOOD PRESSURE: 128 MMHG | HEIGHT: 72 IN | WEIGHT: 227.8 LBS

## 2024-10-15 VITALS — HEIGHT: 77 IN | WEIGHT: 227 LBS | BODY MASS INDEX: 26.8 KG/M2

## 2024-10-15 DIAGNOSIS — I49.1 ATRIAL ECTOPY: ICD-10-CM

## 2024-10-15 DIAGNOSIS — M79.89 PAIN AND SWELLING OF LEFT LOWER LEG: Primary | ICD-10-CM

## 2024-10-15 DIAGNOSIS — I50.22 CHRONIC SYSTOLIC CONGESTIVE HEART FAILURE: Primary | ICD-10-CM

## 2024-10-15 DIAGNOSIS — R06.02 SOB (SHORTNESS OF BREATH) ON EXERTION: Primary | ICD-10-CM

## 2024-10-15 DIAGNOSIS — R60.0 LEG EDEMA, LEFT: ICD-10-CM

## 2024-10-15 DIAGNOSIS — M79.662 PAIN AND SWELLING OF LEFT LOWER LEG: Primary | ICD-10-CM

## 2024-10-15 DIAGNOSIS — I50.9 NEW ONSET OF CONGESTIVE HEART FAILURE: ICD-10-CM

## 2024-10-15 DIAGNOSIS — I48.91 ATRIAL FIBRILLATION, UNSPECIFIED TYPE: ICD-10-CM

## 2024-10-15 DIAGNOSIS — R06.02 SOB (SHORTNESS OF BREATH) ON EXERTION: ICD-10-CM

## 2024-10-15 LAB
BH CV LOW VAS LEFT LESSER SAPH VESSEL: 1
BH CV LOWER VASCULAR LEFT COMMON FEMORAL AUGMENT: NORMAL
BH CV LOWER VASCULAR LEFT COMMON FEMORAL COMPETENT: NORMAL
BH CV LOWER VASCULAR LEFT COMMON FEMORAL COMPRESS: NORMAL
BH CV LOWER VASCULAR LEFT COMMON FEMORAL PHASIC: NORMAL
BH CV LOWER VASCULAR LEFT COMMON FEMORAL SPONT: NORMAL
BH CV LOWER VASCULAR LEFT DISTAL FEMORAL COMPRESS: NORMAL
BH CV LOWER VASCULAR LEFT GASTRONEMIUS COMPRESS: NORMAL
BH CV LOWER VASCULAR LEFT GREATER SAPH AK COMPRESS: NORMAL
BH CV LOWER VASCULAR LEFT GREATER SAPH BK COMPRESS: NORMAL
BH CV LOWER VASCULAR LEFT LESSER SAPH COMPRESS: NORMAL
BH CV LOWER VASCULAR LEFT MID FEMORAL AUGMENT: NORMAL
BH CV LOWER VASCULAR LEFT MID FEMORAL COMPETENT: NORMAL
BH CV LOWER VASCULAR LEFT MID FEMORAL COMPRESS: NORMAL
BH CV LOWER VASCULAR LEFT MID FEMORAL PHASIC: NORMAL
BH CV LOWER VASCULAR LEFT MID FEMORAL SPONT: NORMAL
BH CV LOWER VASCULAR LEFT PERONEAL COMPRESS: NORMAL
BH CV LOWER VASCULAR LEFT POPLITEAL AUGMENT: NORMAL
BH CV LOWER VASCULAR LEFT POPLITEAL COMPETENT: NORMAL
BH CV LOWER VASCULAR LEFT POPLITEAL COMPRESS: NORMAL
BH CV LOWER VASCULAR LEFT POPLITEAL PHASIC: NORMAL
BH CV LOWER VASCULAR LEFT POPLITEAL SPONT: NORMAL
BH CV LOWER VASCULAR LEFT POSTERIOR TIBIAL COMPRESS: NORMAL
BH CV LOWER VASCULAR LEFT PROXIMAL FEMORAL COMPRESS: NORMAL
BH CV LOWER VASCULAR LEFT SAPHENOFEMORAL JUNCTION COMPRESS: NORMAL
BH CV LOWER VASCULAR RIGHT COMMON FEMORAL AUGMENT: NORMAL
BH CV LOWER VASCULAR RIGHT COMMON FEMORAL COMPETENT: NORMAL
BH CV LOWER VASCULAR RIGHT COMMON FEMORAL PHASIC: NORMAL
BH CV LOWER VASCULAR RIGHT COMMON FEMORAL SPONT: NORMAL

## 2024-10-15 PROCEDURE — 93971 EXTREMITY STUDY: CPT | Performed by: INTERNAL MEDICINE

## 2024-10-15 PROCEDURE — 71046 X-RAY EXAM CHEST 2 VIEWS: CPT

## 2024-10-15 PROCEDURE — 93971 EXTREMITY STUDY: CPT

## 2024-10-15 RX ORDER — FUROSEMIDE 40 MG
40 TABLET ORAL 2 TIMES DAILY
Qty: 180 TABLET | Refills: 2 | Status: SHIPPED | OUTPATIENT
Start: 2024-10-15

## 2024-10-15 RX ORDER — EZETIMIBE 10 MG/1
1 TABLET ORAL DAILY
COMMUNITY
Start: 2024-10-10

## 2024-10-15 RX ORDER — METOPROLOL SUCCINATE 50 MG/1
50 TABLET, EXTENDED RELEASE ORAL 2 TIMES DAILY
Qty: 180 TABLET | Refills: 3 | Status: SHIPPED | OUTPATIENT
Start: 2024-10-15

## 2024-10-15 NOTE — PROGRESS NOTES
[facility]  Office Note  Hardeep James  1955  [address]   [facility] [address]     VISIT DATE:  10/15/24    PCP: Clara Bajwa, DAVID  [address]  [facility] [address]    CC: Leg pain  Chief Complaint   Patient presents with    Chronic systolic congestive heart failure       PROBLEM LIST:    New atrial fibrillation 10/20/2024  Mildly reduced left ventricular function 50% June 2020  Normal perfusion March 2020    Allergies  No Known Allergies    Current Medications    Current Outpatient Medications:     acetaminophen (TYLENOL) 325 MG tablet, Take 2 tablets by mouth Every 6 (Six) Hours As Needed for Mild Pain ., Disp: , Rfl:     DULoxetine (CYMBALTA) 30 MG capsule, Take 1 capsule by mouth Daily., Disp: , Rfl:     ezetimibe (ZETIA) 10 MG tablet, Take 1 tablet by mouth Daily., Disp: , Rfl:     furosemide (LASIX) 40 MG tablet, Take 1 tablet by mouth 2 (Two) Times a Day., Disp: 180 tablet, Rfl: 2    Multiple Vitamins-Minerals (CENTRUM ADULTS PO), Take 1 tablet by mouth Daily., Disp: , Rfl:     valsartan (DIOVAN) 40 MG tablet, TAKE 1 TABLET BY MOUTH EVERY DAY, Disp: 90 tablet, Rfl: 3    vitamin C (ASCORBIC ACID) 250 MG tablet, Take 1 tablet by mouth Daily., Disp: , Rfl:     vitamin E 100 UNIT capsule, Take 1 capsule by mouth Daily., Disp: , Rfl:     apixaban (ELIQUIS) 5 MG tablet tablet, Take 1 tablet by mouth 2 (Two) Times a Day., Disp: 60 tablet, Rfl: 11    metoprolol succinate XL (TOPROL-XL) 50 MG 24 hr tablet, Take 1 tablet by mouth 2 (Two) Times a Day., Disp: 180 tablet, Rfl: 3    pravastatin (PRAVACHOL) 10 MG tablet, Take 10 mg by mouth every night at bedtime. (Patient not taking: Reported on 10/15/2024), Disp: , Rfl:      History of Present Illness   Hardeep James is a 69 y.o. year old male who presents for consult from No ref. provider found for evaluation of leg swelling.  Patient states his have about a month ago.  Patient recently thought he had a spider  "bite.  Patient has been keeping his leg elevated this does improve swelling.  Patient Nuys any injury to this lower extremity.  Patient denies any travel.  Patient's calf is swollen throughout his left calf.  Patient denies any chest pain pressure discomfort.  Does have some shortness of breath.  States this has been started promptly in the past.  Heart rate has not been checked nor has his blood pressure routinely at home.  When his blood pressure is checked however has been 120s over 80s.  Patient denies any syncope lightheadedness or dizziness.  Patient has been compliant with his medical therapy.  Patient had an EKG done at his primary care physician with atrial fibrillation seen.  This would be a new diagnosis for this patient.  Patient was sent here for further evaluation.  Patient does not have a history of cerebrovascular accident.    Pt denies any chest pain,  or claudication. Pt denies history of CHF, DVT, PE, MI, CVA, TIA, or rheumatic fever.     SOCIAL HX  Social History     Socioeconomic History    Marital status:    Tobacco Use    Smoking status: Former     Current packs/day: 0.00     Types: Cigarettes     Quit date: 2020     Years since quittin.7    Smokeless tobacco: Never   Vaping Use    Vaping status: Never Used   Substance and Sexual Activity    Alcohol use: Yes     Comment: fireball on the weekends    Drug use: Never    Sexual activity: Defer       FAMILY HX  Family History   Problem Relation Age of Onset    Heart disease Mother     Cancer Mother     Emphysema Mother     COPD Mother     Cancer Father     Lung cancer Father        Vitals:    10/15/24 1334   BP: 128/92   Pulse: 104   SpO2: 96%   Weight: 103 kg (227 lb 12.8 oz)   Height: 182.9 cm (72\")     Body mass index is 30.9 kg/m².     PHYSICAL EXAMINATION:  Vitals and nursing note reviewed.   Constitutional:       Appearance: Healthy appearance. Not in distress.   Eyes:      Conjunctiva/sclera: Conjunctivae normal.      " Pupils: Pupils are equal, round, and reactive to light.   HENT:      Nose: Nose normal.    Mouth/Throat:      Pharynx: Oropharynx is clear.   Neck:      Vascular: JVD normal.   Pulmonary:      Effort: Pulmonary effort is normal.      Breath sounds: Normal breath sounds. No wheezing. No rhonchi. No rales.   Cardiovascular:      PMI at left midclavicular line. Normal rate. Irregular rhythm. Normal S1. Normal S2.       Murmurs: There is no murmur.      No gallop.  No click. No rub.   Pulses:     Intact distal pulses.   Abdominal:      General: Bowel sounds are normal.      Palpations: Abdomen is soft.      Tenderness: There is no abdominal tenderness.   Musculoskeletal: Normal range of motion.         General: No tenderness.      Cervical back: Normal range of motion. Skin:     General: Skin is warm and dry.   Neurological:      General: No focal deficit present.      Mental Status: Alert and oriented to person, place and time.      Cranial Nerves: Cranial nerves 2-12 are intact.       Edema noted throughout the left calf.  There is no tenderness.  There is a 1+ DP/PT pulse minimal redness is noted  Diagnostic Data:  Lab Results   Component Value Date    TRIG 66 02/08/2020    HDL 49 02/08/2020     Lab Results   Component Value Date    GLUCOSE 126 (H) 02/14/2020    BUN 24 (H) 02/14/2020    CREATININE 1.09 02/14/2020     02/14/2020    K 5.3 (H) 02/14/2020    CL 99 02/14/2020    CO2 30.0 (H) 02/14/2020     Lab Results   Component Value Date    HGBA1C 5.70 (H) 02/08/2020     Lab Results   Component Value Date    WBC 8.21 02/09/2020    HGB 14.6 02/09/2020    HCT 43.8 02/09/2020     02/09/2020         ECG 12 Lead    Date/Time: 10/15/2024 2:07 PM  Performed by: Kemal Abbott MD    Authorized by: Kemal Abbott MD  Comparison: not compared with previous ECG   Previous ECG: no previous ECG available  Rhythm: atrial fibrillation  Rate: tachycardic  Other findings: non-specific ST-T wave  changes    Clinical impression: abnormal EKG          Advance Care Planning   ACP discussion was declined by the patient. Patient does not have an advance directive, declines further assistance.         ASSESSMENT:  Diagnoses and all orders for this visit:    1. Chronic systolic congestive heart failure (Primary)  -     Adult Transthoracic Echo Complete W/ Cont if Necessary Per Protocol; Future    2. New onset of congestive heart failure  -     Adult Transthoracic Echo Complete W/ Cont if Necessary Per Protocol; Future    3. Atrial ectopy  -     Adult Transthoracic Echo Complete W/ Cont if Necessary Per Protocol; Future    4. Leg edema, left  -     US Venous Doppler Lower Extremity Left (duplex for insufficiency); Future  -     Adult Transthoracic Echo Complete W/ Cont if Necessary Per Protocol; Future    Other orders  -     metoprolol succinate XL (TOPROL-XL) 50 MG 24 hr tablet; Take 1 tablet by mouth 2 (Two) Times a Day.  Dispense: 180 tablet; Refill: 3  -     apixaban (ELIQUIS) 5 MG tablet tablet; Take 1 tablet by mouth 2 (Two) Times a Day.  Dispense: 60 tablet; Refill: 11  -     furosemide (LASIX) 40 MG tablet; Take 1 tablet by mouth 2 (Two) Times a Day.  Dispense: 180 tablet; Refill: 2        PLAN:    Undergo venous duplex for evaluation for possible DVT today have arranged  As atrial fibrillation will start Eliquis 5 mg twice daily.  Patient explained high risk medication.  Will bring back in 1 month and consider cardioversion at that appointment if remains in atrial fibrillation  Will stop carvedilol and placed on Toprol-XL 50 mg twice daily for rate control  Undergo repeat echocardiogram at follow-up to evaluate his left ventricular function  May need repeat ischemic evaluation with coronary CT.  Will determine based on echocardiogram and symptoms at follow-up  Can stop aspirin for now  Will increase Lasix to 40 mg twice daily due to dyspnea and leg swelling    Return in about 4 weeks (around 11/12/2024).      Kemal Abbott MD

## 2024-10-16 ENCOUNTER — TELEPHONE (OUTPATIENT)
Dept: CARDIOLOGY | Facility: CLINIC | Age: 69
End: 2024-10-16
Payer: COMMERCIAL

## 2024-10-16 NOTE — TELEPHONE ENCOUNTER
----- Message from Kemal Abbott sent at 10/15/2024  5:33 PM EDT -----  Symptomatic care, will be on eliquis for afib, need to see if he needs assistance/help with this med also

## 2024-10-16 NOTE — TELEPHONE ENCOUNTER
Called [facility] pharmacy. Eliquis is covered by pt insurance with a copay of $35.     I spoke to pt and informed him what Dr. Abbott said about results. He verbalized understanding. He has no picked up the Eliquis yet. I told him the copay, and pt said he can pay that. I did offer him the Eliquis copay cards for a free month, and $10 copay for up to 24 months. Pt said he is ok to pay the $35. He had no other questions or concerns at this time.

## 2024-11-07 VITALS
HEART RATE: 112 BPM | BODY MASS INDEX: 31.15 KG/M2 | WEIGHT: 230 LBS | TEMPERATURE: 98.4 F | SYSTOLIC BLOOD PRESSURE: 115 MMHG | OXYGEN SATURATION: 97 % | DIASTOLIC BLOOD PRESSURE: 99 MMHG | HEIGHT: 72 IN | RESPIRATION RATE: 18 BRPM

## 2024-11-07 PROCEDURE — 99283 EMERGENCY DEPT VISIT LOW MDM: CPT

## 2024-11-08 ENCOUNTER — HOSPITAL ENCOUNTER (EMERGENCY)
Facility: HOSPITAL | Age: 69
Discharge: HOME | End: 2024-11-08
Attending: EMERGENCY MEDICINE
Payer: COMMERCIAL

## 2024-11-08 DIAGNOSIS — I83.899 BLEEDING FROM VARICOSE VEIN: Primary | ICD-10-CM

## 2024-11-08 RX ORDER — DIAPER,BRIEF,INFANT-TODD,DISP
1 EACH MISCELLANEOUS ONCE
Status: COMPLETED | OUTPATIENT
Start: 2024-11-08 | End: 2024-11-08

## 2024-11-08 RX ADMIN — BACITRACIN 0.9 G: 500 OINTMENT TOPICAL at 02:12

## 2024-11-08 NOTE — ED PROVIDER NOTES
Subjective   History of Present Illness  This is a 69-year-old male with past medical history of hypertension presented to the emergency department with bleeding from his right leg.  Patient states that he was getting out of shower when he scratched his leg and did have some significant bleeding.  The patient does take Eliquis.  Patient states that he could not get it to stop bleeding.  They called EMS at that time.  Patient did not sustain any other injuries.  His tetanus is up-to-date.  He denies any headache or change in vision.  No focal weakness.  No chest pain or shortness of breath.    History provided by:  Patient and EMS personnel   used: No        Review of Systems   Constitutional: Negative.    Respiratory: Negative.     Cardiovascular: Negative.    Skin:  Positive for wound.   Neurological: Negative.        Past Medical History:   Diagnosis Date    Depression     Hypertension     Peripheral neuropathy        No Known Allergies    Past Surgical History:   Procedure Laterality Date    HUMERUS FRACTURE SURGERY  1992       Family History   Problem Relation Age of Onset    Heart disease Mother     Cancer Mother     Emphysema Mother     COPD Mother     Cancer Father     Lung cancer Father        Social History     Socioeconomic History    Marital status:    Tobacco Use    Smoking status: Former     Current packs/day: 0.00     Types: Cigarettes     Quit date: 2020     Years since quittin.8    Smokeless tobacco: Never   Vaping Use    Vaping status: Never Used   Substance and Sexual Activity    Alcohol use: Yes     Comment: fireball on the weekends    Drug use: Never    Sexual activity: Defer           Objective   Physical Exam  Vitals and nursing note reviewed.   Constitutional:       General: He is not in acute distress.     Appearance: He is not ill-appearing or toxic-appearing.   Cardiovascular:      Pulses: Normal pulses.   Musculoskeletal:      Comments: Patient has a  small area along the right lateral lower leg consistent with an abrasion.  There is no active bleeding at this time.  No pulsatile mass or lesions.  Compartment soft.  Neurovascular intact   Neurological:      General: No focal deficit present.      Mental Status: He is alert.      Motor: No weakness.         Procedures           ED Course  ED Course as of 11/08/24 0157 Fri Nov 08, 2024 0128 BP: 115/99 [JK]   0128 Temp: 98.4 °F (36.9 °C) [JK]   0128 Temp src: Oral [JK]   0128 Heart Rate: 112 [JK]   0128 Resp: 18 [JK]   0128 SpO2: 97 %  Interpretation:  Patient's vitals were directly viewed and interpreted by myself.   O2 sat 97% on room air, interpreted as normal  Telemetry revealed a rate of 112 bpm, interpreted as sinus tachycardia [JK]   0156 On reevaluation ration, the patient has not had any rebleeding.  We did apply a sterile dressing to the area.  Patient was given wound care instructions.  Follow-up with PCP in 48 hours.  Given strict return precautions.  Verbalized understanding. [JK]   0156 Shared decision making:   After full review of the patient's clinical presentation, review of any work-up including but not limited to laboratory studies and radiology obtained, I had a discussion with the patient.  Treatment options were discussed as well as the risks, benefits and consequences.  I discussed all findings with the patient and family members if available.  During the discussion, treatment goals were understood by all as well as any misconceptions which were addressed with the patient.  Ample time was given for any questions they may have had.  They are in agreement with the treatment plan as well as final disposition. [JK]      ED Course User Index  [JK] Sharif Major MD                                               Medical Decision Making  This is a 69-year-old male presenting to the emergency department with some bleeding from his right lower leg.  The patient has evidence of a small  abrasion and likely a persistent bleeding from anticoagulation.  We did apply pressure dressing and the patient is not having any evidence of rebleeding at this time.  Patient's neurovascular exam is normal.  Patient be observed for any rebleeding.      Differential diagnosis: Bleeding superficial vein, bleeding on anticoagulation, abrasion, contusion, hematoma    Amount and/or Complexity of Data Reviewed  Independent Historian: EMS    Risk  OTC drugs.        Final diagnoses:   Bleeding from varicose vein       ED Disposition  ED Disposition       ED Disposition   Discharge    Condition   Stable    Comment   --               Clara Bajwa, APRN  [address] [address]  [phone]    Call in 1 day           Medication List      No changes were made to your prescriptions during this visit.            Sharif Major MD  11/08/24 0157

## 2024-11-19 ENCOUNTER — HOSPITAL ENCOUNTER (OUTPATIENT)
Dept: CARDIOLOGY | Facility: HOSPITAL | Age: 69
Discharge: HOME | End: 2024-11-19
Admitting: INTERNAL MEDICINE
Payer: COMMERCIAL

## 2024-11-19 ENCOUNTER — OFFICE VISIT (OUTPATIENT)
Dept: CARDIOLOGY | Facility: CLINIC | Age: 69
End: 2024-11-19
Payer: COMMERCIAL

## 2024-11-19 VITALS
HEIGHT: 72 IN | DIASTOLIC BLOOD PRESSURE: 101 MMHG | WEIGHT: 229.28 LBS | SYSTOLIC BLOOD PRESSURE: 121 MMHG | BODY MASS INDEX: 31.05 KG/M2

## 2024-11-19 VITALS
HEIGHT: 72 IN | OXYGEN SATURATION: 97 % | WEIGHT: 233 LBS | DIASTOLIC BLOOD PRESSURE: 90 MMHG | BODY MASS INDEX: 31.56 KG/M2 | HEART RATE: 99 BPM | SYSTOLIC BLOOD PRESSURE: 118 MMHG

## 2024-11-19 DIAGNOSIS — R60.0 LEG EDEMA, LEFT: ICD-10-CM

## 2024-11-19 DIAGNOSIS — I50.22 CHRONIC SYSTOLIC CONGESTIVE HEART FAILURE: ICD-10-CM

## 2024-11-19 DIAGNOSIS — I49.1 ATRIAL ECTOPY: ICD-10-CM

## 2024-11-19 DIAGNOSIS — I50.9 NEW ONSET OF CONGESTIVE HEART FAILURE: ICD-10-CM

## 2024-11-19 DIAGNOSIS — I42.0 CARDIOMYOPATHY, DILATED: Primary | ICD-10-CM

## 2024-11-19 DIAGNOSIS — I48.91 ATRIAL FIBRILLATION, UNSPECIFIED TYPE: ICD-10-CM

## 2024-11-19 LAB
ASCENDING AORTA: 4.6 CM
BH CV ECHO MEAS - AO MAX PG: 2.31 MMHG
BH CV ECHO MEAS - AO MEAN PG: 1 MMHG
BH CV ECHO MEAS - AO ROOT DIAM: 4.5 CM
BH CV ECHO MEAS - AO V2 MAX: 75.5 CM/SEC
BH CV ECHO MEAS - EF(MOD-BP): 31.7 %
BH CV ECHO MEAS - IVS/LVPW: 1 CM
BH CV ECHO MEAS - IVSD: 1.1 CM
BH CV ECHO MEAS - LA DIMENSION: 5.7 CM
BH CV ECHO MEAS - LAT PEAK E' VEL: 16.4 CM/SEC
BH CV ECHO MEAS - LV MAX PG: 1.86 MMHG
BH CV ECHO MEAS - LV MEAN PG: 1.22 MMHG
BH CV ECHO MEAS - LV V1 MAX: 68.1 CM/SEC
BH CV ECHO MEAS - LV V1 VTI: 11.4 CM
BH CV ECHO MEAS - LVIDD: 4.9 CM
BH CV ECHO MEAS - LVIDS: 4.2 CM
BH CV ECHO MEAS - LVOT DIAM: 2.2 CM
BH CV ECHO MEAS - LVPWD: 1.1 CM
BH CV ECHO MEAS - MED PEAK E' VEL: 8.7 CM/SEC
BH CV ECHO MEAS - MR MAX PG: 86.7 MMHG
BH CV ECHO MEAS - MR MAX VEL: 465.3 CM/SEC
BH CV ECHO MEAS - MV E MAX VEL: 94.4 CM/SEC
BH CV ECHO MEAS - MV MAX PG: 3.6 MMHG
BH CV ECHO MEAS - MV MEAN PG: 1.98 MMHG
BH CV ECHO MEAS - PA ACC TIME: 0.07 SEC
BH CV ECHO MEAS - RAP SYSTOLE: 15 MMHG
BH CV ECHO MEAS - RVSP: 52.9 MMHG
BH CV ECHO MEAS - TAPSE (>1.6): 1.9 CM
BH CV ECHO MEAS - TR MAX PG: 37.9 MMHG
BH CV ECHO MEAS - TR MAX VEL: 308 CM/SEC
BH CV ECHO MEASUREMENTS AVERAGE E/E' RATIO: 7.52
BH CV XLRA - RV BASE: 5.3 CM
BH CV XLRA - RV LENGTH: 8.5 CM
BH CV XLRA - RV MID: 3.7 CM
BH CV XLRA - TDI S': 10.7 CM/SEC
LEFT ATRIUM VOLUME INDEX: 74.9 ML/M2

## 2024-11-19 PROCEDURE — 93306 TTE W/DOPPLER COMPLETE: CPT

## 2024-11-19 PROCEDURE — 25010000002 SULFUR HEXAFLUORIDE MICROSPH 60.7-25 MG RECONSTITUTED SUSPENSION: Performed by: INTERNAL MEDICINE

## 2024-11-19 PROCEDURE — 93306 TTE W/DOPPLER COMPLETE: CPT | Performed by: INTERNAL MEDICINE

## 2024-11-19 PROCEDURE — 99214 OFFICE O/P EST MOD 30 MIN: CPT | Performed by: INTERNAL MEDICINE

## 2024-11-19 RX ORDER — VALSARTAN 80 MG/1
1 TABLET ORAL DAILY
COMMUNITY
Start: 2024-10-10

## 2024-11-19 RX ADMIN — SULFUR HEXAFLUORIDE 2 ML: KIT at 15:03

## 2024-11-19 NOTE — PROGRESS NOTES
[facility]  Office Note  Hardeep James  1955  [address] [address]     VISIT DATE:  11/19/24    PCP: Clara Bajwa, APRN  [address] [facility] [address]  [place] [id number]    CC: Lower extremity swelling  Chief Complaint   Patient presents with    Chronic systolic congestive heart failure       PROBLEM LIST:    New atrial fibrillation 10/20/2024  Mildly reduced left ventricular function 50% June 2020  Normal perfusion March 2020  Left ventricular function reduced at 30 to 35% with moderate to severe tricuspid and moderate mitral regurgitation echocardiogram November 2024    Allergies  No Known Allergies    Current Medications    Current Outpatient Medications:     acetaminophen (TYLENOL) 325 MG tablet, Take 2 tablets by mouth Every 6 (Six) Hours As Needed for Mild Pain ., Disp: , Rfl:     apixaban (ELIQUIS) 5 MG tablet tablet, Take 1 tablet by mouth 2 (Two) Times a Day., Disp: 60 tablet, Rfl: 11    DULoxetine (CYMBALTA) 30 MG capsule, Take 1 capsule by mouth Daily., Disp: , Rfl:     ezetimibe (ZETIA) 10 MG tablet, Take 1 tablet by mouth Daily., Disp: , Rfl:     furosemide (LASIX) 40 MG tablet, Take 1 tablet by mouth 2 (Two) Times a Day., Disp: 180 tablet, Rfl: 2    metoprolol succinate XL (TOPROL-XL) 50 MG 24 hr tablet, Take 1 tablet by mouth 2 (Two) Times a Day., Disp: 180 tablet, Rfl: 3    Multiple Vitamins-Minerals (CENTRUM ADULTS PO), Take 1 tablet by mouth Daily., Disp: , Rfl:     valsartan (DIOVAN) 40 MG tablet, TAKE 1 TABLET BY MOUTH EVERY DAY, Disp: 90 tablet, Rfl: 3    vitamin C (ASCORBIC ACID) 250 MG tablet, Take 1 tablet by mouth Daily., Disp: , Rfl:     vitamin E 100 UNIT capsule, Take 1 capsule by mouth Daily., Disp: , Rfl:     valsartan (DIOVAN) 80 MG tablet, Take 1 tablet by mouth Daily., Disp: , Rfl:   No current facility-administered medications for this visit.     History of Present Illness   Hardeep James is a 69 y.o. year old male who  "presents for consult from No ref. provider found for evaluation of atrial fibrillation.  Patient denies any chest pain pressure discomfort.  No real shortness of breath.  Does continue to have left leg swelling patient is compliant his anticoagulation.  No bleeding issues.  Patient is emergency room for continued left leg swelling.  Patient is to see if his primary care provider on Thursday.  Otherwise denies palpitations.    Pt denies any chest pain, dyspnea at rest, dyspnea on exertion, orthopnea, PND, palpitations, lower extremity edema, or claudication. Pt denies history of CHF, DVT, PE, MI, CVA, TIA, or rheumatic fever.     SOCIAL HX  Social History     Socioeconomic History    Marital status: Significant Other   Tobacco Use    Smoking status: Former     Current packs/day: 0.00     Types: Cigarettes     Quit date: 2020     Years since quittin.8    Smokeless tobacco: Never   Vaping Use    Vaping status: Never Used   Substance and Sexual Activity    Alcohol use: Yes     Comment: fireball on the weekends    Drug use: Never    Sexual activity: Defer       FAMILY HX  Family History   Problem Relation Age of Onset    Heart disease Mother     Cancer Mother     Emphysema Mother     COPD Mother     Cancer Father     Lung cancer Father        Vitals:    24 1521   BP: 118/90   BP Location: Right arm   Patient Position: Sitting   Cuff Size: Adult   Pulse: 99   SpO2: 97%   Weight: 106 kg (233 lb)   Height: 182.9 cm (72\")     Body mass index is 31.6 kg/m².     PHYSICAL EXAMINATION:  Vitals and nursing note reviewed.   Constitutional:       Appearance: Healthy appearance. Not in distress.   Eyes:      Conjunctiva/sclera: Conjunctivae normal.      Pupils: Pupils are equal, round, and reactive to light.   HENT:      Nose: Nose normal.    Mouth/Throat:      Pharynx: Oropharynx is clear.   Neck:      Vascular: JVD normal.   Pulmonary:      Effort: Pulmonary effort is normal.      Breath sounds: Normal breath " sounds. No wheezing. No rhonchi. No rales.   Cardiovascular:      PMI at left midclavicular line. Normal rate. Regular rhythm. Normal S1. Normal S2.       Murmurs: There is no murmur.      No gallop.  No click. No rub.   Pulses:     Intact distal pulses.   Abdominal:      General: Bowel sounds are normal.      Palpations: Abdomen is soft.      Tenderness: There is no abdominal tenderness.   Musculoskeletal: Normal range of motion.         General: No tenderness.      Cervical back: Normal range of motion. Skin:     General: Skin is warm and dry.   Neurological:      General: No focal deficit present.      Mental Status: Alert and oriented to person, place and time.      Cranial Nerves: Cranial nerves 2-12 are intact.         Diagnostic Data:  Lab Results   Component Value Date    TRIG 66 02/08/2020    HDL 49 02/08/2020     Lab Results   Component Value Date    GLUCOSE 126 (H) 02/14/2020    BUN 24 (H) 02/14/2020    CREATININE 1.09 02/14/2020     02/14/2020    K 5.3 (H) 02/14/2020    CL 99 02/14/2020    CO2 30.0 (H) 02/14/2020     Lab Results   Component Value Date    HGBA1C 5.70 (H) 02/08/2020     Lab Results   Component Value Date    WBC 8.21 02/09/2020    HGB 14.6 02/09/2020    HCT 43.8 02/09/2020     02/09/2020       Procedures    Advance Care Planning   ACP discussion was declined by the patient. Patient does not have an advance directive, declines further assistance.         ASSESSMENT:  Diagnoses and all orders for this visit:    1. Cardiomyopathy, dilated (Primary)  -     Cancel: Case Request Cath Lab: Right and Left Heart Cath    2. Leg edema, left    3. Chronic systolic congestive heart failure    4. New onset of congestive heart failure    5. Atrial ectopy    6. Atrial fibrillation, unspecified type        PLAN:    Continue current care at this time  Offered left and right heart catheterization with possible cardioversion however patient deferred given cost  Discussed further medical therapy  however patient deferred given cost  Patient will consider we will see patient back in 2 months time.    Return in about 2 months (around 1/19/2025).     Kemal Abbott MD

## 2024-11-22 ENCOUNTER — TELEPHONE (OUTPATIENT)
Dept: CARDIOLOGY | Facility: CLINIC | Age: 69
End: 2024-11-22
Payer: COMMERCIAL

## 2024-11-22 ENCOUNTER — DOCUMENTATION (OUTPATIENT)
Dept: CARDIOLOGY | Facility: CLINIC | Age: 69
End: 2024-11-22
Payer: COMMERCIAL

## 2024-11-22 RX ORDER — DIGOXIN 125 MCG
125 TABLET ORAL DAILY
Qty: 30 TABLET | Refills: 5 | Status: SHIPPED | OUTPATIENT
Start: 2024-11-22

## 2024-11-22 NOTE — PROGRESS NOTES
Given patient's heart rate elevated primary care physician along with heart failure will add digoxin.  Limited given patient's financial situation.  Will arrange for patient to be seen within the next couple weeks.

## 2024-11-22 NOTE — TELEPHONE ENCOUNTER
"Message from Dr. Abbott, \"see if he will come in after thanksgiving, add digoxin 0.125mg daily.\"    Spoke to pt and informed him what Dr. Abbott said. He verbalized understanding and is agreeable to appt and medication. Med has been sent to pharmacy, and message sent to  to schedule pt appt for 12/3.      "

## 2024-11-22 NOTE — TELEPHONE ENCOUNTER
Caller: Hardeep James    Relationship to patient: Self    Best call back number: [phone]     Chief complaint: ELEVATED HEART RATE, LOW BLOOD PRESSURE & ELEVATED BMP    Type of visit: F/U APPT    Requested date: NEXT AVAILABLE     If rescheduling, when is the original appointment: 1-21-25     Additional notes:PLEASE CONTACT PATIENT WITH A SUITABLE DATE. PATIENT'S PCP IS REQUESTING HE BE SEEN EARLIER

## 2024-11-22 NOTE — TELEPHONE ENCOUNTER
Spoke to pt. He states he saw his pcp yesterday and she told him to get back in with Dr. Abbott sooner than 2 months. He states he is taking the Metoprolol and Eliquis that Dr. Abbott started him on last month. Pt was just seen this week by Dr. Abbott and did not want a cath or further medical therapy at this time due to cost.     I have requested the office visit note from pcp and will give to Dr. Abbott to review once received.

## 2024-12-03 ENCOUNTER — OFFICE VISIT (OUTPATIENT)
Dept: CARDIOLOGY | Facility: CLINIC | Age: 69
End: 2024-12-03
Payer: COMMERCIAL

## 2024-12-03 VITALS
HEART RATE: 54 BPM | HEIGHT: 72 IN | DIASTOLIC BLOOD PRESSURE: 92 MMHG | OXYGEN SATURATION: 100 % | SYSTOLIC BLOOD PRESSURE: 124 MMHG | WEIGHT: 235.6 LBS | BODY MASS INDEX: 31.91 KG/M2

## 2024-12-03 DIAGNOSIS — I42.9 CARDIOMYOPATHY, UNSPECIFIED TYPE: ICD-10-CM

## 2024-12-03 DIAGNOSIS — I10 ESSENTIAL HYPERTENSION: ICD-10-CM

## 2024-12-03 DIAGNOSIS — I42.0 CARDIOMYOPATHY, DILATED: ICD-10-CM

## 2024-12-03 DIAGNOSIS — I49.1 ATRIAL ECTOPY: ICD-10-CM

## 2024-12-03 DIAGNOSIS — I50.9 NEW ONSET OF CONGESTIVE HEART FAILURE: ICD-10-CM

## 2024-12-03 DIAGNOSIS — I50.22 CHRONIC SYSTOLIC CONGESTIVE HEART FAILURE: Primary | ICD-10-CM

## 2024-12-03 PROCEDURE — 99214 OFFICE O/P EST MOD 30 MIN: CPT | Performed by: INTERNAL MEDICINE

## 2024-12-03 RX ORDER — FUROSEMIDE 40 MG/1
40 TABLET ORAL 2 TIMES DAILY
Qty: 180 TABLET | Refills: 2 | Status: SHIPPED | OUTPATIENT
Start: 2024-12-03

## 2024-12-03 NOTE — PROGRESS NOTES
[facility] Cardiology  Office Note  Hardeep James  1955  [address] [address]     VISIT DATE:  12/03/24    PCP: Clara Bajwa, APRN  [address] [id number]    CC:  Chief Complaint   Patient presents with    Cardiomyopathy       PROBLEM LIST:    New atrial fibrillation 10/20/2024  Mildly reduced left ventricular function 50% June 2020  Normal perfusion March 2020  Left ventricular function reduced at 30 to 35% with moderate to severe tricuspid and moderate mitral regurgitation echocardiogram November 2024    Allergies  No Known Allergies    Current Medications    Current Outpatient Medications:     acetaminophen (TYLENOL) 325 MG tablet, Take 2 tablets by mouth Every 6 (Six) Hours As Needed for Mild Pain ., Disp: , Rfl:     apixaban (ELIQUIS) 5 MG tablet tablet, Take 1 tablet by mouth 2 (Two) Times a Day., Disp: 60 tablet, Rfl: 11    digoxin (LANOXIN) 125 MCG tablet, Take 1 tablet by mouth Daily., Disp: 30 tablet, Rfl: 5    DULoxetine (CYMBALTA) 30 MG capsule, Take 1 capsule by mouth Daily., Disp: , Rfl:     ezetimibe (ZETIA) 10 MG tablet, Take 1 tablet by mouth Daily., Disp: , Rfl:     furosemide (LASIX) 40 MG tablet, Take 1 tablet by mouth 2 (Two) Times a Day., Disp: 180 tablet, Rfl: 2    metoprolol succinate XL (TOPROL-XL) 50 MG 24 hr tablet, Take 1 tablet by mouth 2 (Two) Times a Day., Disp: 180 tablet, Rfl: 3    Multiple Vitamins-Minerals (CENTRUM ADULTS PO), Take 1 tablet by mouth Daily., Disp: , Rfl:     valsartan (DIOVAN) 40 MG tablet, TAKE 1 TABLET BY MOUTH EVERY DAY, Disp: 90 tablet, Rfl: 3    valsartan (DIOVAN) 80 MG tablet, Take 1 tablet by mouth Daily., Disp: , Rfl:     vitamin C (ASCORBIC ACID) 250 MG tablet, Take 1 tablet by mouth Daily., Disp: , Rfl:     vitamin E 100 UNIT capsule, Take 1 capsule by mouth Daily., Disp: , Rfl:      History of Present Illness   Hardeep James is a 69 y.o. year old male who presents for consult from No  "ref. provider found for evaluation of atrial fibrillation.  Patient feeling relatively well at this time.  No chest pain pressure discomfort.  Had 1 episode of shortness of breath however took additional diuretic for such and felt better.  Blood pressure heart rate to be controlled at home.  No palpitations.  Patient started digoxin 3 days ago.  Patient is very active on recent honeymoon to [place].  States has had no orthopnea or paroxysmal nocturnal dyspnea.  Lower extremity swelling has remained unchanged and improved overall.  Other wise is active.    Pt denies any chest pain, dyspnea at rest, dyspnea on exertion, orthopnea, PND, palpitations, lower extremity edema, or claudication. Pt denies history of CHF, DVT, PE, MI, CVA, TIA, or rheumatic fever.     SOCIAL HX  Social History     Socioeconomic History    Marital status:    Tobacco Use    Smoking status: Former     Current packs/day: 0.00     Types: Cigarettes     Quit date: 2020     Years since quittin.8    Smokeless tobacco: Never   Vaping Use    Vaping status: Never Used   Substance and Sexual Activity    Alcohol use: Yes     Comment: fireball on the weekends    Drug use: Never    Sexual activity: Defer       FAMILY HX  Family History   Problem Relation Age of Onset    Heart disease Mother     Cancer Mother     Emphysema Mother     COPD Mother     Cancer Father     Lung cancer Father        Vitals:    24 1440   BP: 124/92   BP Location: Left arm   Patient Position: Sitting   Cuff Size: Adult   Pulse: 54   SpO2: 100%   Weight: 107 kg (235 lb 9.6 oz)   Height: 182.9 cm (72.01\")     Body mass index is 31.95 kg/m².     PHYSICAL EXAMINATION:  Vitals and nursing note reviewed.   Constitutional:       Appearance: Healthy appearance. Not in distress.   Eyes:      Conjunctiva/sclera: Conjunctivae normal.      Pupils: Pupils are equal, round, and reactive to light.   HENT:      Nose: Nose normal.    Mouth/Throat:      Pharynx: Oropharynx is " clear.   Neck:      Vascular: JVD normal.   Pulmonary:      Effort: Pulmonary effort is normal.      Breath sounds: Normal breath sounds. No wheezing. No rhonchi. No rales.   Cardiovascular:      PMI at left midclavicular line. Normal rate. Irregular rhythm. Normal S1. Normal S2.       Murmurs: There is no murmur.      No gallop.  No click. No rub.   Pulses:     Intact distal pulses.   Abdominal:      General: Bowel sounds are normal.      Palpations: Abdomen is soft.      Tenderness: There is no abdominal tenderness.   Musculoskeletal: Normal range of motion.         General: No tenderness.      Cervical back: Normal range of motion. Skin:     General: Skin is warm and dry.   Neurological:      General: No focal deficit present.      Mental Status: Alert and oriented to person, place and time.      Cranial Nerves: Cranial nerves 2-12 are intact.         Diagnostic Data:  Lab Results   Component Value Date    TRIG 66 02/08/2020    HDL 49 02/08/2020     Lab Results   Component Value Date    GLUCOSE 126 (H) 02/14/2020    BUN 24 (H) 02/14/2020    CREATININE 1.09 02/14/2020     02/14/2020    K 5.3 (H) 02/14/2020    CL 99 02/14/2020    CO2 30.0 (H) 02/14/2020     Lab Results   Component Value Date    HGBA1C 5.70 (H) 02/08/2020     Lab Results   Component Value Date    WBC 8.21 02/09/2020    HGB 14.6 02/09/2020    HCT 43.8 02/09/2020     02/09/2020       Procedures    Advance Care Planning   ACP discussion was declined by the patient. Patient does not have an advance directive, declines further assistance.         ASSESSMENT:  Diagnoses and all orders for this visit:    1. Chronic systolic congestive heart failure (Primary)    2. Essential hypertension    3. New onset of congestive heart failure    4. Cardiomyopathy, unspecified type    5. Cardiomyopathy, dilated    6. Atrial ectopy    Other orders  -     furosemide (LASIX) 40 MG tablet; Take 1 tablet by mouth 2 (Two) Times a Day.  Dispense: 180 tablet;  Refill: 2        PLAN:    Patient asymptomatic at this time.  Patient still concerned about cost of procedures.  Will increase diuretics to more of a maintenance dose of 40 mg twice daily as states breathing better on such.  Continue digoxin at this time  Continue other medical therapy at this time  Long-term still recommend cardioversion and left and right heart catheterization however given financial issues and patient asymptomatic we will continue to monitor for now.    Return in about 3 months (around 3/3/2025).     Kemal Abbott MD

## 2025-01-20 ENCOUNTER — TELEPHONE (OUTPATIENT)
Dept: CARDIOLOGY | Facility: CLINIC | Age: 70
End: 2025-01-20

## 2025-01-20 NOTE — TELEPHONE ENCOUNTER
Caller: Hardeep James    Relationship to patient: Self    Best call back number: [phone]     Chief complaint: DIZZINESS, BLURRED VISION    Type of visit: F/U APPT    Requested date: NEXT AVAILABLE       Additional notes:PLEASE CONTACT PATIENT WITH A SUITABLE DATE. PATIENT WAS SEEN ON 1-19-25 BY THE CLINIC AT [facility]. WAS ADVISED TO SEE HIS CARDIOLOGIST.

## 2025-01-21 ENCOUNTER — OFFICE VISIT (OUTPATIENT)
Dept: CARDIOLOGY | Facility: CLINIC | Age: 70
End: 2025-01-21
Payer: COMMERCIAL

## 2025-01-21 VITALS
SYSTOLIC BLOOD PRESSURE: 122 MMHG | WEIGHT: 228.6 LBS | DIASTOLIC BLOOD PRESSURE: 80 MMHG | BODY MASS INDEX: 30.3 KG/M2 | HEART RATE: 98 BPM | OXYGEN SATURATION: 97 % | HEIGHT: 73 IN

## 2025-01-21 DIAGNOSIS — I50.9 NEW ONSET OF CONGESTIVE HEART FAILURE: ICD-10-CM

## 2025-01-21 DIAGNOSIS — I10 ESSENTIAL HYPERTENSION: ICD-10-CM

## 2025-01-21 DIAGNOSIS — I50.22 CHRONIC SYSTOLIC CONGESTIVE HEART FAILURE: Primary | ICD-10-CM

## 2025-01-21 RX ORDER — VALSARTAN 40 MG/1
40 TABLET ORAL DAILY
Qty: 90 TABLET | Refills: 3 | Status: SHIPPED | OUTPATIENT
Start: 2025-01-21

## 2025-01-21 NOTE — PROGRESS NOTES
[facility]  Office Note  Hardeep James  1955  [address] [address]     VISIT DATE:  01/21/25    PCP: Clara Bajwa, APRN  [address] [facility] [address]  [place] [id number]    CC: Atrial fibrillation  Chief Complaint   Patient presents with    Chronic systolic congestive heart failure       PROBLEM LIST:    1. New atrial fibrillation 10/20/2024  2. Mildly reduced left ventricular function 50% June 2020  3. Normal perfusion March 2020  4. Left ventricular function reduced at 30 to 35% with moderate to severe tricuspid and moderate mitral regurgitation echocardiogram November 2024       Allergies  No Known Allergies    Current Medications    Current Outpatient Medications:     acetaminophen (TYLENOL) 325 MG tablet, Take 2 tablets by mouth Every 6 (Six) Hours As Needed for Mild Pain ., Disp: , Rfl:     apixaban (ELIQUIS) 5 MG tablet tablet, Take 1 tablet by mouth 2 (Two) Times a Day., Disp: 60 tablet, Rfl: 11    digoxin (LANOXIN) 125 MCG tablet, Take 1 tablet by mouth Daily., Disp: 30 tablet, Rfl: 5    DULoxetine (CYMBALTA) 30 MG capsule, Take 1 capsule by mouth Daily., Disp: , Rfl:     ezetimibe (ZETIA) 10 MG tablet, Take 1 tablet by mouth Daily., Disp: , Rfl:     furosemide (LASIX) 40 MG tablet, Take 1 tablet by mouth 2 (Two) Times a Day., Disp: 180 tablet, Rfl: 2    metoprolol succinate XL (TOPROL-XL) 50 MG 24 hr tablet, Take 1 tablet by mouth 2 (Two) Times a Day., Disp: 180 tablet, Rfl: 3    Multiple Vitamins-Minerals (CENTRUM ADULTS PO), Take 1 tablet by mouth Daily., Disp: , Rfl:     valsartan (DIOVAN) 40 MG tablet, Take 1 tablet by mouth Daily., Disp: 90 tablet, Rfl: 3    valsartan (DIOVAN) 80 MG tablet, Take 1 tablet by mouth Daily., Disp: , Rfl:     vitamin C (ASCORBIC ACID) 250 MG tablet, Take 1 tablet by mouth Daily., Disp: , Rfl:     vitamin E 100 UNIT capsule, Take 1 capsule by mouth Daily., Disp: , Rfl:      History of Present Illness   Hardeep James  "is a 69 y.o. year old male who presents for consult from No ref. provider found for evaluation of vision issues.  Patient last week started having issues seeing out of his left eye.  Patient had stopped his anticoagulation given some bleeding from his right leg.  Patient does have venous varicosities.  Patient however cannot see out of his left eye.  States just a shadow.  Patient cannot get really much in the way of other details.  No injection of eye.  Minimal pain.  Had otherwise been doing well.  Been active overall.  Had been out of his valsartan.  Patient to make about his medical therapy otherwise.  No syncope or lightheadedness.  No dyspnea.  No palpitations.    Pt denies any chest pain, dyspnea at rest, dyspnea on exertion, orthopnea, PND, palpitations, lower extremity edema, or claudication. Pt denies history of CHF, DVT, PE, MI, CVA, TIA, or rheumatic fever.     SOCIAL HX  Social History     Socioeconomic History    Marital status:    Tobacco Use    Smoking status: Former     Current packs/day: 0.00     Types: Cigarettes     Quit date: 2020     Years since quittin.0    Smokeless tobacco: Never   Vaping Use    Vaping status: Never Used   Substance and Sexual Activity    Alcohol use: Yes     Comment: fireball on the weekends    Drug use: Never    Sexual activity: Defer       FAMILY HX  Family History   Problem Relation Age of Onset    Heart disease Mother     Cancer Mother     Emphysema Mother     COPD Mother     Cancer Father     Lung cancer Father        Vitals:    25 1339   BP: 122/80   BP Location: Left arm   Patient Position: Sitting   Cuff Size: Adult   Pulse: 98   SpO2: 97%   Weight: 104 kg (228 lb 9.6 oz)   Height: 185.4 cm (73\")     Body mass index is 30.16 kg/m².     PHYSICAL EXAMINATION:  Vitals and nursing note reviewed.   Constitutional:       Appearance: Healthy appearance. Not in distress.   Eyes:      Conjunctiva/sclera: Conjunctivae normal.      Pupils: Pupils are " equal, round, and reactive to light.   HENT:      Nose: Nose normal.    Mouth/Throat:      Pharynx: Oropharynx is clear.   Neck:      Vascular: JVD normal.   Pulmonary:      Effort: Pulmonary effort is normal.      Breath sounds: Normal breath sounds. No wheezing. No rhonchi. No rales.   Cardiovascular:      PMI at left midclavicular line. Normal rate. Irregular rhythm. Normal S1. Normal S2.       Murmurs: There is no murmur.      No gallop.  No click. No rub.   Pulses:     Intact distal pulses.   Abdominal:      General: Bowel sounds are normal.      Palpations: Abdomen is soft.      Tenderness: There is no abdominal tenderness.   Musculoskeletal: Normal range of motion.         General: No tenderness.      Cervical back: Normal range of motion. Skin:     General: Skin is warm and dry.   Neurological:      General: No focal deficit present.      Mental Status: Alert and oriented to person, place and time.      Cranial Nerves: Cranial nerves 2-12 are intact.         Diagnostic Data:  Lab Results   Component Value Date    TRIG 66 02/08/2020    HDL 49 02/08/2020     Lab Results   Component Value Date    GLUCOSE 126 (H) 02/14/2020    BUN 24 (H) 02/14/2020    CREATININE 1.09 02/14/2020     02/14/2020    K 5.3 (H) 02/14/2020    CL 99 02/14/2020    CO2 30.0 (H) 02/14/2020     Lab Results   Component Value Date    HGBA1C 5.70 (H) 02/08/2020     Lab Results   Component Value Date    WBC 8.21 02/09/2020    HGB 14.6 02/09/2020    HCT 43.8 02/09/2020     02/09/2020       Procedures    Advance Care Planning   ACP discussion was declined by the patient. Patient does not have an advance directive, declines further assistance.         ASSESSMENT:  Diagnoses and all orders for this visit:    1. Chronic systolic congestive heart failure (Primary)    2. Essential hypertension    3. New onset of congestive heart failure    Other orders  -     valsartan (DIOVAN) 40 MG tablet; Take 1 tablet by mouth Daily.  Dispense: 90  tablet; Refill: 3        PLAN:    Need to continue anticoagulation reinforced with patient.  Discussed leg elevation compression for venous insufficiency.  Discussed this would not be significant bleeding and no need to stop anticoagulation when has such.  Hold on further evaluation for cerebrovascular accident other than patient to be seen by optometrist as significant other/wife works for such.  Holding further workup/testing/cardioversion given patient's concerned about cost.  Continue medical therapy otherwise.  Discussed emergency room referral and patient defers at this time is unlikely to be able to change treatment given timing of event.    Return in about 4 weeks (around 2/18/2025).     Kemal Abbott MD

## 2025-01-30 ENCOUNTER — TELEPHONE (OUTPATIENT)
Dept: CARDIOLOGY | Facility: CLINIC | Age: 70
End: 2025-01-30
Payer: COMMERCIAL

## 2025-01-30 DIAGNOSIS — R09.89 SUSPECTED CEREBROVASCULAR ACCIDENT (CVA): ICD-10-CM

## 2025-01-30 DIAGNOSIS — H53.129 TRANSIENT VISION LOSS DUE TO LIGHT: Primary | ICD-10-CM

## 2025-01-30 NOTE — TELEPHONE ENCOUNTER
Received call from pt wife Tequila. She states that Dr. Abbott had told pt to see his Ophthalmologist. He saw Dr. Baum today. Note is in chart from care everywhere. She states that Dr. Baum told them his vision is fine, other than the peripheral vision issues that Dr. Abbott already knew about.     She states that Dr. Baum said Dr. Abbott should have ordered tests for pt to have done. He told pt he should go urgently to the ER to have several tests done. Tequila states that pt does not want to go to ER for financial reasons. They want to know if there is anything Dr. Abbott thinks pt should have, and if so, can we order them so pt does not have to go to the ER.    Pt wants to return to work, and they won't let him go back until his vision issues are taken care of.    Please advise.

## 2025-01-30 NOTE — TELEPHONE ENCOUNTER
Spoke to pt wife Tequila and informed her what Dr. Abbott said. She verbalized understanding. She wants to know if Dr. Abbott could go ahead and order the carotid duplex, and they will see how much it would cost to know if pt can afford to have it done.    Is this ok to order?

## 2025-01-31 NOTE — TELEPHONE ENCOUNTER
Spoke to Tequila and informed her Dr. Abbott will complete paperwork. She verbalized understanding and said she will contact his employer to send the paperwork.

## 2025-02-07 ENCOUNTER — TELEPHONE (OUTPATIENT)
Dept: CARDIOLOGY | Facility: CLINIC | Age: 70
End: 2025-02-07
Payer: COMMERCIAL

## 2025-02-07 RX ORDER — DIGOXIN 125 MCG
125 TABLET ORAL DAILY
Qty: 90 TABLET | Refills: 1 | Status: SHIPPED | OUTPATIENT
Start: 2025-02-07

## 2025-02-07 NOTE — TELEPHONE ENCOUNTER
Received fax refill request from [facility] pharmacy for a 90 day prescription for Digoxin. Script was originally sent in for 30 day supply.    Resent script for 90 day supply.

## 2025-02-28 ENCOUNTER — HOSPITAL ENCOUNTER (OUTPATIENT)
Dept: CARDIOLOGY | Facility: HOSPITAL | Age: 70
Discharge: HOME | End: 2025-02-28
Admitting: INTERNAL MEDICINE
Payer: COMMERCIAL

## 2025-02-28 VITALS — BODY MASS INDEX: 30.39 KG/M2 | HEIGHT: 73 IN | WEIGHT: 229.28 LBS

## 2025-02-28 DIAGNOSIS — R09.89 SUSPECTED CEREBROVASCULAR ACCIDENT (CVA): ICD-10-CM

## 2025-02-28 DIAGNOSIS — H53.129 TRANSIENT VISION LOSS DUE TO LIGHT: ICD-10-CM

## 2025-02-28 LAB
BH CV XLRA MEAS LEFT DIST CCA EDV: 16.6 CM/SEC
BH CV XLRA MEAS LEFT DIST CCA PSV: 65.4 CM/SEC
BH CV XLRA MEAS LEFT DIST ICA EDV: 22.5 CM/SEC
BH CV XLRA MEAS LEFT DIST ICA PSV: 64.1 CM/SEC
BH CV XLRA MEAS LEFT ICA/CCA RATIO: 0.65
BH CV XLRA MEAS LEFT MID CCA EDV: 16.8 CM/SEC
BH CV XLRA MEAS LEFT MID CCA PSV: 72.2 CM/SEC
BH CV XLRA MEAS LEFT MID ICA EDV: 28.1 CM/SEC
BH CV XLRA MEAS LEFT MID ICA PSV: 73.6 CM/SEC
BH CV XLRA MEAS LEFT PROX CCA EDV: 21.8 CM/SEC
BH CV XLRA MEAS LEFT PROX CCA PSV: 113.4 CM/SEC
BH CV XLRA MEAS LEFT PROX ECA EDV: 16.8 CM/SEC
BH CV XLRA MEAS LEFT PROX ECA PSV: 91.9 CM/SEC
BH CV XLRA MEAS LEFT PROX ICA EDV: 7.8 CM/SEC
BH CV XLRA MEAS LEFT PROX ICA PSV: 45.6 CM/SEC
BH CV XLRA MEAS LEFT PROX SCLA PSV: 100.8 CM/SEC
BH CV XLRA MEAS LEFT VERTEBRAL A EDV: 10.9 CM/SEC
BH CV XLRA MEAS LEFT VERTEBRAL A PSV: 35.1 CM/SEC
BH CV XLRA MEAS RIGHT DIST CCA EDV: 18.6 CM/SEC
BH CV XLRA MEAS RIGHT DIST CCA PSV: 83.5 CM/SEC
BH CV XLRA MEAS RIGHT DIST ICA EDV: 27.7 CM/SEC
BH CV XLRA MEAS RIGHT DIST ICA PSV: 71.6 CM/SEC
BH CV XLRA MEAS RIGHT ICA/CCA RATIO: 0.83
BH CV XLRA MEAS RIGHT MID CCA EDV: 15.6 CM/SEC
BH CV XLRA MEAS RIGHT MID CCA PSV: 86.1 CM/SEC
BH CV XLRA MEAS RIGHT MID ICA EDV: 14.3 CM/SEC
BH CV XLRA MEAS RIGHT MID ICA PSV: 39.7 CM/SEC
BH CV XLRA MEAS RIGHT PROX CCA EDV: 16 CM/SEC
BH CV XLRA MEAS RIGHT PROX CCA PSV: 54.4 CM/SEC
BH CV XLRA MEAS RIGHT PROX ECA EDV: 22.1 CM/SEC
BH CV XLRA MEAS RIGHT PROX ECA PSV: 108.4 CM/SEC
BH CV XLRA MEAS RIGHT PROX ICA EDV: 10.6 CM/SEC
BH CV XLRA MEAS RIGHT PROX ICA PSV: 46 CM/SEC
BH CV XLRA MEAS RIGHT PROX SCLA PSV: 112.1 CM/SEC
BH CV XLRA MEAS RIGHT VERTEBRAL A EDV: 11.7 CM/SEC
BH CV XLRA MEAS RIGHT VERTEBRAL A PSV: 37.8 CM/SEC
LEFT ARM BP: NORMAL MMHG
RIGHT ARM BP: NORMAL MMHG

## 2025-02-28 PROCEDURE — 93880 EXTRACRANIAL BILAT STUDY: CPT

## 2025-03-04 PROBLEM — H53.9 TRANSIENT VISION DISTURBANCE: Status: ACTIVE | Noted: 2025-03-04

## 2025-03-04 NOTE — PROGRESS NOTES
"    Cardiology Outpatient Visit      Identification: Hardeep James is a 69 y.o. male who resides in [place], [place].     Reason for visit:  HFrEF  Atrial fibrillation        Subjective      Mr. Duran returns to cardiology clinic.  He was recently diagnosed with HFrEF as well as atrial fibrillation.  He states his breathing is fine.  He denies any palpitation symptoms.  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  This has partially improved.  He saw an ophthalmologist to diagnosed him with \"nerve damage\" and mini stroke.  He had stopped apixaban several days after a varicose vein ruptured on his leg and bled.        Review of Systems   Cardiovascular:  Negative for chest pain, claudication, cyanosis, dyspnea on exertion, irregular heartbeat, leg swelling, near-syncope, orthopnea, palpitations, paroxysmal nocturnal dyspnea and syncope.   Respiratory:  Negative for cough, hemoptysis, shortness of breath, sleep disturbances due to breathing, snoring, sputum production and wheezing.        Allergies   Allergen Reactions    Atorvastatin Myalgia    Pravastatin Mental Status Change         Current Outpatient Medications   Medication Instructions    acetaminophen (TYLENOL) 650 mg, Oral, Every 6 Hours PRN    apixaban (ELIQUIS) 5 mg, Oral, 2 Times Daily    digoxin (LANOXIN) 125 mcg, Oral, Daily    DULoxetine (CYMBALTA) 30 mg, Daily    ezetimibe (ZETIA) 10 mg, Oral, Daily    furosemide (LASIX) 40 mg, Oral, Daily    metoprolol succinate XL (TOPROL-XL) 50 mg, Oral, Daily    Multiple Vitamins-Minerals (CENTRUM ADULTS PO) 1 tablet, Daily    valsartan (DIOVAN) 80 mg, Oral, Daily    vitamin C (ASCORBIC ACID) 250 mg, Daily    vitamin E 100 Units, Daily         Objective     /88 (BP Location: Left arm, Patient Position: Sitting, Cuff Size: Adult)   Pulse 74   Ht 185.4 cm (73\")   Wt 104 kg (228 lb 12.8 oz)   SpO2 99%   BMI 30.19 kg/m²       Constitutional:       Appearance: Healthy appearance.   Eyes:      General: No " scleral icterus.  Neck:      Thyroid: No thyroid mass.      Vascular: No carotid bruit or JVD. JVD normal.   Pulmonary:      Effort: Pulmonary effort is normal.      Breath sounds: Normal breath sounds.   Cardiovascular:      Normal rate. Irregularly irregular rhythm.      Murmurs: There is no murmur.      No gallop.    Edema:     Peripheral edema absent.   Skin:     General: Skin is warm. There is no cyanosis.   Neurological:      General: No focal deficit present.      Mental Status: Alert.   Psychiatric:         Attention and Perception: Attention normal.         Result Review  (reviewed with patient):            Labs (11/21/2024):      WBC 6.0, RBC 4.26, HGB 14.1, HCT 42.9,   Glucose 124, creat 0.98. BUN 17, Na 140, K 4.7, AST 23, ALT 29  TSH 1.96  HA1C 6.3  Chol 127, Trig 55, HDL 44, Direct LDL 83        Assessment     Diagnoses and all orders for this visit:    1. Persistent atrial fibrillation (Primary)  Overview:  Detected at PCP office, 10/2024  [id number] (age, CHF, TIA/CVA)  Echo (11/19/2024): LVEF 32%.  Moderately dilated left atrium.  Moderate MR.  Moderate to severe TR.  RVSP 53 mmHg    Assessment & Plan:  In atrial fibrillation presently  Obtain 5-day Holter monitor  If persistent atrial fibrillation, recommend external cardioversion  Continue apixaban 5 mg twice daily for stroke prophylaxis    Orders:  -     apixaban (ELIQUIS) 5 MG tablet tablet; Take 1 tablet by mouth 2 (Two) Times a Day.  Dispense: 180 tablet; Refill: 3  -     Holter Monitor - 72 Hour Up To 15 Days; Future  -     metoprolol succinate XL (TOPROL-XL) 50 MG 24 hr tablet; Take 1 tablet by mouth Daily.  Dispense: 90 tablet; Refill: 3  -     digoxin (LANOXIN) 125 MCG tablet; Take 1 tablet by mouth Daily.  Dispense: 90 tablet; Refill: 3    2. Chronic systolic congestive heart failure  Overview:  Echo (6/16/2020): EF 50%.  No significant valve  Echo (11/19/2024): EF 32%. The left atrial cavity is moderately dilated.  Moderate MR. Moderate to severe tricuspid valve regurgitation. RVSP 53 mmHg. Aortic root measures 4.5 cm.     Assessment & Plan:  Stage C HFrEF with stable NYHA class II symptoms  Continue metoprolol succinate, valsartan  Consider starting empagliflozin or spironolactone following cardioversion    Orders:  -     metoprolol succinate XL (TOPROL-XL) 50 MG 24 hr tablet; Take 1 tablet by mouth Daily.  Dispense: 90 tablet; Refill: 3  -     valsartan (DIOVAN) 80 MG tablet; Take 1 tablet by mouth Daily.  Dispense: 90 tablet; Refill: 3  -     furosemide (LASIX) 40 MG tablet; Take 1 tablet by mouth Daily.  Dispense: 90 tablet; Refill: 3    3. Hyperlipidemia LDL goal <70  Overview:  Historically intolerant to multiple statin    Assessment & Plan:  Obtain LP(a)    Orders:  -     Lipoprotein A (LPA); Future  -     ezetimibe (ZETIA) 10 MG tablet; Take 1 tablet by mouth Daily.  Dispense: 90 tablet; Refill: 3          Plan   5-day Holter monitor  If atrial fibrillation persistent, recommend external cardioversion  Consider adding spironolactone and empagliflozin for HFrEF following cardioversion      Follow-up   Return in about 3 months (around 6/6/2025) for Follow-up with cardiology APRN/PA.        Allan Gillespie MD, FACC, [facility]  3/6/2025

## 2025-03-06 ENCOUNTER — OFFICE VISIT (OUTPATIENT)
Dept: CARDIOLOGY | Facility: CLINIC | Age: 70
End: 2025-03-06
Payer: COMMERCIAL

## 2025-03-06 VITALS
WEIGHT: 228.8 LBS | HEIGHT: 73 IN | OXYGEN SATURATION: 99 % | DIASTOLIC BLOOD PRESSURE: 88 MMHG | SYSTOLIC BLOOD PRESSURE: 122 MMHG | HEART RATE: 74 BPM | BODY MASS INDEX: 30.32 KG/M2

## 2025-03-06 DIAGNOSIS — I50.22 CHRONIC SYSTOLIC CONGESTIVE HEART FAILURE: ICD-10-CM

## 2025-03-06 DIAGNOSIS — I48.19 PERSISTENT ATRIAL FIBRILLATION: Primary | ICD-10-CM

## 2025-03-06 DIAGNOSIS — E78.5 HYPERLIPIDEMIA LDL GOAL <70: ICD-10-CM

## 2025-03-06 PROBLEM — I50.9 NEW ONSET OF CONGESTIVE HEART FAILURE: Status: RESOLVED | Noted: 2020-02-07 | Resolved: 2025-03-06

## 2025-03-06 PROBLEM — I42.0 CARDIOMYOPATHY, DILATED: Status: RESOLVED | Noted: 2024-11-19 | Resolved: 2025-03-06

## 2025-03-06 PROBLEM — I42.9 CARDIOMYOPATHY: Status: RESOLVED | Noted: 2020-02-09 | Resolved: 2025-03-06

## 2025-03-06 PROBLEM — R60.0 BILATERAL LEG EDEMA: Status: RESOLVED | Noted: 2024-10-15 | Resolved: 2025-03-06

## 2025-03-06 PROBLEM — I49.1 ATRIAL ECTOPY: Status: RESOLVED | Noted: 2020-02-09 | Resolved: 2025-03-06

## 2025-03-06 PROBLEM — I48.91 ATRIAL FIBRILLATION: Status: ACTIVE | Noted: 2025-03-06

## 2025-03-06 PROBLEM — I48.91 A-FIB: Status: RESOLVED | Noted: 2020-02-07 | Resolved: 2025-03-06

## 2025-03-06 PROBLEM — I10 ESSENTIAL HYPERTENSION: Status: RESOLVED | Noted: 2020-02-07 | Resolved: 2025-03-06

## 2025-03-06 PROCEDURE — 99214 OFFICE O/P EST MOD 30 MIN: CPT | Performed by: INTERNAL MEDICINE

## 2025-03-06 RX ORDER — METOPROLOL SUCCINATE 50 MG/1
50 TABLET, EXTENDED RELEASE ORAL DAILY
Qty: 90 TABLET | Refills: 3 | Status: SHIPPED | OUTPATIENT
Start: 2025-03-06

## 2025-03-06 RX ORDER — DIGOXIN 125 MCG
125 TABLET ORAL DAILY
Qty: 90 TABLET | Refills: 3 | Status: SHIPPED | OUTPATIENT
Start: 2025-03-06

## 2025-03-06 RX ORDER — VALSARTAN 80 MG/1
80 TABLET ORAL DAILY
Qty: 90 TABLET | Refills: 3 | Status: SHIPPED | OUTPATIENT
Start: 2025-03-06

## 2025-03-06 RX ORDER — EZETIMIBE 10 MG/1
10 TABLET ORAL DAILY
Qty: 90 TABLET | Refills: 3 | Status: SHIPPED | OUTPATIENT
Start: 2025-03-06

## 2025-03-06 RX ORDER — FUROSEMIDE 40 MG/1
40 TABLET ORAL DAILY
Qty: 90 TABLET | Refills: 3 | Status: SHIPPED | OUTPATIENT
Start: 2025-03-06

## 2025-03-06 NOTE — ASSESSMENT & PLAN NOTE
In atrial fibrillation presently  Obtain 5-day Holter monitor  If persistent atrial fibrillation, recommend external cardioversion  Continue apixaban 5 mg twice daily for stroke prophylaxis

## 2025-03-06 NOTE — LETTER
"March 6, 2025     DAVID Jenkins  [address] [facility] [address]  [facility] [address]    Patient: Hardeep James   YOB: 1955   Date of Visit: 3/6/2025     Dear DAVID Jenkins:       Thank you for referring Hardeep James to me for evaluation. Below are the relevant portions of my assessment and plan of care.    If you have questions, please do not hesitate to call me. I look forward to following Hardeep along with you.         Sincerely,        Tucker Gillespie IV, MD        CC: No Recipients    Tucker Gillespie IV, MD  03/06/25 1115  Sign when Signing Visit      Cardiology Outpatient Visit      Identification: Hardeep James is a 69 y.o. male who resides in [place], [place].     Reason for visit:  HFrEF  Atrial fibrillation        Subjective     Mr. Duran returns to cardiology clinic.  He was recently diagnosed with HFrEF as well as atrial fibrillation.  He states his breathing is fine.  He denies any palpitation symptoms.  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  This has partially improved.  He saw an ophthalmologist to diagnosed him with \"nerve damage\" and mini stroke.  He had stopped apixaban several days after a varicose vein ruptured on his leg and bled.        Review of Systems   Cardiovascular:  Negative for chest pain, claudication, cyanosis, dyspnea on exertion, irregular heartbeat, leg swelling, near-syncope, orthopnea, palpitations, paroxysmal nocturnal dyspnea and syncope.   Respiratory:  Negative for cough, hemoptysis, shortness of breath, sleep disturbances due to breathing, snoring, sputum production and wheezing.        Allergies   Allergen Reactions    Atorvastatin Myalgia    Pravastatin Mental Status Change         Current Outpatient Medications   Medication Instructions    acetaminophen (TYLENOL) 650 mg, Oral, Every 6 Hours PRN    apixaban (ELIQUIS) 5 mg, Oral, 2 Times Daily    digoxin (LANOXIN) 125 mcg, Oral, Daily    DULoxetine (CYMBALTA) 30 " "mg, Daily    ezetimibe (ZETIA) 10 mg, Oral, Daily    furosemide (LASIX) 40 mg, Oral, Daily    metoprolol succinate XL (TOPROL-XL) 50 mg, Oral, Daily    Multiple Vitamins-Minerals (CENTRUM ADULTS PO) 1 tablet, Daily    valsartan (DIOVAN) 80 mg, Oral, Daily    vitamin C (ASCORBIC ACID) 250 mg, Daily    vitamin E 100 Units, Daily         Objective    /88 (BP Location: Left arm, Patient Position: Sitting, Cuff Size: Adult)   Pulse 74   Ht 185.4 cm (73\")   Wt 104 kg (228 lb 12.8 oz)   SpO2 99%   BMI 30.19 kg/m²       Constitutional:       Appearance: Healthy appearance.   Eyes:      General: No scleral icterus.  Neck:      Thyroid: No thyroid mass.      Vascular: No carotid bruit or JVD. JVD normal.   Pulmonary:      Effort: Pulmonary effort is normal.      Breath sounds: Normal breath sounds.   Cardiovascular:      Normal rate. Irregularly irregular rhythm.      Murmurs: There is no murmur.      No gallop.    Edema:     Peripheral edema absent.   Skin:     General: Skin is warm. There is no cyanosis.   Neurological:      General: No focal deficit present.      Mental Status: Alert.   Psychiatric:         Attention and Perception: Attention normal.         Result Review (reviewed with patient):            Labs (11/21/2024):      WBC 6.0, RBC 4.26, HGB 14.1, HCT 42.9,   Glucose 124, creat 0.98. BUN 17, Na 140, K 4.7, AST 23, ALT 29  TSH 1.96  HA1C 6.3  Chol 127, Trig 55, HDL 44, Direct LDL 83        Assessment    Diagnoses and all orders for this visit:    1. Persistent atrial fibrillation (Primary)  Overview:  Detected at PCP office, 10/2024  [id number] 4 (age, CHF, TIA/CVA)  Echo (11/19/2024): LVEF 32%.  Moderately dilated left atrium.  Moderate MR.  Moderate to severe TR.  RVSP 53 mmHg    Assessment & Plan:  In atrial fibrillation presently  Obtain 5-day Holter monitor  If persistent atrial fibrillation, recommend external cardioversion  Continue apixaban 5 mg twice daily for stroke " prophylaxis    Orders:  -     apixaban (ELIQUIS) 5 MG tablet tablet; Take 1 tablet by mouth 2 (Two) Times a Day.  Dispense: 180 tablet; Refill: 3  -     Holter Monitor - 72 Hour Up To 15 Days; Future  -     metoprolol succinate XL (TOPROL-XL) 50 MG 24 hr tablet; Take 1 tablet by mouth Daily.  Dispense: 90 tablet; Refill: 3  -     digoxin (LANOXIN) 125 MCG tablet; Take 1 tablet by mouth Daily.  Dispense: 90 tablet; Refill: 3    2. Chronic systolic congestive heart failure  Overview:  Echo (6/16/2020): EF 50%.  No significant valve  Echo (11/19/2024): EF 32%. The left atrial cavity is moderately dilated. Moderate MR. Moderate to severe tricuspid valve regurgitation. RVSP 53 mmHg. Aortic root measures 4.5 cm.     Assessment & Plan:  Stage C HFrEF with stable NYHA class II symptoms  Continue metoprolol succinate, valsartan  Consider starting empagliflozin or spironolactone following cardioversion    Orders:  -     metoprolol succinate XL (TOPROL-XL) 50 MG 24 hr tablet; Take 1 tablet by mouth Daily.  Dispense: 90 tablet; Refill: 3  -     valsartan (DIOVAN) 80 MG tablet; Take 1 tablet by mouth Daily.  Dispense: 90 tablet; Refill: 3  -     furosemide (LASIX) 40 MG tablet; Take 1 tablet by mouth Daily.  Dispense: 90 tablet; Refill: 3    3. Hyperlipidemia LDL goal <70  Overview:  Historically intolerant to multiple statin    Assessment & Plan:  Obtain LP(a)    Orders:  -     Lipoprotein A (LPA); Future  -     ezetimibe (ZETIA) 10 MG tablet; Take 1 tablet by mouth Daily.  Dispense: 90 tablet; Refill: 3          Plan  5-day Holter monitor  If atrial fibrillation persistent, recommend external cardioversion  Consider adding spironolactone and empagliflozin for HFrEF following cardioversion      Follow-up   Return in about 3 months (around 6/6/2025) for Follow-up with cardiology APRN/PA.        Allan Gillespie MD, [facility], [facility]  3/6/2025

## 2025-03-06 NOTE — ASSESSMENT & PLAN NOTE
Stage C HFrEF with stable NYHA class II symptoms  Continue metoprolol succinate, valsartan  Consider starting empagliflozin or spironolactone following cardioversion

## 2025-03-18 ENCOUNTER — TELEPHONE (OUTPATIENT)
Dept: CARDIOLOGY | Facility: CLINIC | Age: 70
End: 2025-03-18

## 2025-04-01 ENCOUNTER — RESULTS FOLLOW-UP (OUTPATIENT)
Dept: CARDIOLOGY | Facility: CLINIC | Age: 70
End: 2025-04-01
Payer: COMMERCIAL

## 2025-04-01 DIAGNOSIS — I48.19 PERSISTENT ATRIAL FIBRILLATION: Primary | ICD-10-CM

## 2025-04-02 NOTE — TELEPHONE ENCOUNTER
Left detailed message on personalized VM. Advised to continue current medications, including Eliquis. Order placed.

## 2025-04-04 ENCOUNTER — PREP FOR SURGERY (OUTPATIENT)
Dept: OTHER | Facility: HOSPITAL | Age: 70
End: 2025-04-04
Payer: COMMERCIAL

## 2025-04-04 DIAGNOSIS — I48.19 ATRIAL FIBRILLATION, PERSISTENT: Primary | ICD-10-CM

## 2025-04-04 RX ORDER — SODIUM CHLORIDE 9 MG/ML
40 INJECTION, SOLUTION INTRAVENOUS AS NEEDED
OUTPATIENT
Start: 2025-04-04

## 2025-04-04 RX ORDER — SODIUM CHLORIDE 0.9 % (FLUSH) 0.9 %
10 SYRINGE (ML) INJECTION EVERY 12 HOURS SCHEDULED
OUTPATIENT
Start: 2025-04-04

## 2025-04-04 RX ORDER — SODIUM CHLORIDE 0.9 % (FLUSH) 0.9 %
10 SYRINGE (ML) INJECTION AS NEEDED
OUTPATIENT
Start: 2025-04-04

## 2025-04-16 ENCOUNTER — HOSPITAL ENCOUNTER (OUTPATIENT)
Dept: CARDIOLOGY | Facility: HOSPITAL | Age: 70
Discharge: HOME | End: 2025-04-16
Attending: INTERNAL MEDICINE | Admitting: INTERNAL MEDICINE
Payer: COMMERCIAL

## 2025-04-16 VITALS
HEART RATE: 67 BPM | SYSTOLIC BLOOD PRESSURE: 118 MMHG | OXYGEN SATURATION: 97 % | BODY MASS INDEX: 29.34 KG/M2 | WEIGHT: 221.4 LBS | TEMPERATURE: 97.8 F | HEIGHT: 73 IN | RESPIRATION RATE: 18 BRPM | DIASTOLIC BLOOD PRESSURE: 87 MMHG

## 2025-04-16 DIAGNOSIS — I48.19 ATRIAL FIBRILLATION, PERSISTENT: ICD-10-CM

## 2025-04-16 DIAGNOSIS — I48.19 PERSISTENT ATRIAL FIBRILLATION: Primary | ICD-10-CM

## 2025-04-16 DIAGNOSIS — I50.22 CHRONIC SYSTOLIC CONGESTIVE HEART FAILURE: ICD-10-CM

## 2025-04-16 LAB
ANION GAP SERPL CALCULATED.3IONS-SCNC: 12 MMOL/L (ref 5–15)
BUN SERPL-MCNC: 22 MG/DL (ref 8–23)
BUN/CREAT SERPL: 19.8 (ref 7–25)
CALCIUM SPEC-SCNC: 9.7 MG/DL (ref 8.6–10.5)
CHLORIDE SERPL-SCNC: 100 MMOL/L (ref 98–107)
CO2 SERPL-SCNC: 29 MMOL/L (ref 22–29)
CREAT SERPL-MCNC: 1.11 MG/DL (ref 0.76–1.27)
EGFRCR SERPLBLD CKD-EPI 2021: 72.8 ML/MIN/1.73
GLUCOSE SERPL-MCNC: 128 MG/DL (ref 65–99)
POTASSIUM SERPL-SCNC: 4 MMOL/L (ref 3.5–5.2)
SODIUM SERPL-SCNC: 141 MMOL/L (ref 136–145)

## 2025-04-16 PROCEDURE — 80048 BASIC METABOLIC PNL TOTAL CA: CPT | Performed by: NURSE PRACTITIONER

## 2025-04-16 PROCEDURE — 92960 CARDIOVERSION ELECTRIC EXT: CPT

## 2025-04-16 PROCEDURE — 36415 COLL VENOUS BLD VENIPUNCTURE: CPT

## 2025-04-16 PROCEDURE — 93005 ELECTROCARDIOGRAM TRACING: CPT | Performed by: INTERNAL MEDICINE

## 2025-04-16 RX ORDER — SODIUM CHLORIDE 0.9 % (FLUSH) 0.9 %
10 SYRINGE (ML) INJECTION AS NEEDED
Status: DISCONTINUED | OUTPATIENT
Start: 2025-04-16 | End: 2025-04-16 | Stop reason: HOSPADM

## 2025-04-16 RX ORDER — SODIUM CHLORIDE 9 MG/ML
40 INJECTION, SOLUTION INTRAVENOUS AS NEEDED
Status: DISCONTINUED | OUTPATIENT
Start: 2025-04-16 | End: 2025-04-16 | Stop reason: HOSPADM

## 2025-04-16 RX ORDER — METOPROLOL SUCCINATE 100 MG/1
100 TABLET, EXTENDED RELEASE ORAL DAILY
Qty: 90 TABLET | Refills: 3 | Status: SHIPPED | OUTPATIENT
Start: 2025-04-16

## 2025-04-16 RX ORDER — FUROSEMIDE 20 MG/1
20 TABLET ORAL DAILY
Qty: 90 TABLET | Refills: 3 | Status: SHIPPED | OUTPATIENT
Start: 2025-04-16

## 2025-04-16 RX ORDER — MIDAZOLAM HYDROCHLORIDE 1 MG/ML
2-8 INJECTION, SOLUTION INTRAMUSCULAR; INTRAVENOUS ONCE AS NEEDED
Status: DISCONTINUED | OUTPATIENT
Start: 2025-04-16 | End: 2025-04-16 | Stop reason: HOSPADM

## 2025-04-16 RX ORDER — SPIRONOLACTONE 25 MG/1
25 TABLET ORAL DAILY
Qty: 90 TABLET | Refills: 3 | Status: SHIPPED | OUTPATIENT
Start: 2025-04-16

## 2025-04-16 RX ORDER — FENTANYL CITRATE 50 UG/ML
50-100 INJECTION, SOLUTION INTRAMUSCULAR; INTRAVENOUS ONCE AS NEEDED
Refills: 0 | Status: DISCONTINUED | OUTPATIENT
Start: 2025-04-16 | End: 2025-04-16 | Stop reason: HOSPADM

## 2025-04-16 RX ORDER — ETOMIDATE 2 MG/ML
0.1 INJECTION INTRAVENOUS ONCE
Status: DISCONTINUED | OUTPATIENT
Start: 2025-04-16 | End: 2025-04-16 | Stop reason: HOSPADM

## 2025-04-16 RX ORDER — SODIUM CHLORIDE 0.9 % (FLUSH) 0.9 %
10 SYRINGE (ML) INJECTION EVERY 12 HOURS SCHEDULED
Status: DISCONTINUED | OUTPATIENT
Start: 2025-04-16 | End: 2025-04-16 | Stop reason: HOSPADM

## 2025-04-16 RX ORDER — NALOXONE HCL 0.4 MG/ML
0.4 VIAL (ML) INJECTION ONCE AS NEEDED
Status: DISCONTINUED | OUTPATIENT
Start: 2025-04-16 | End: 2025-04-16 | Stop reason: HOSPADM

## 2025-04-16 RX ORDER — ETOMIDATE 2 MG/ML
INJECTION INTRAVENOUS
Status: COMPLETED | OUTPATIENT
Start: 2025-04-16 | End: 2025-04-16

## 2025-04-16 RX ORDER — FLUMAZENIL 0.1 MG/ML
0.5 INJECTION INTRAVENOUS ONCE AS NEEDED
Status: DISCONTINUED | OUTPATIENT
Start: 2025-04-16 | End: 2025-04-16 | Stop reason: HOSPADM

## 2025-04-16 RX ADMIN — ETOMIDATE 10 MG: 20 INJECTION, SOLUTION INTRAVENOUS at 10:28

## 2025-04-16 NOTE — H&P
[facility]  Cardiovascular History and Physical Note         Patient is a 69-year-old gentleman with a history of chronic systolic heart failure, persistent atrial fibrillation and hyperlipidemia who presents today to undergo external cardioversion for his persistent atrial fibrillation.  The patient was evaluated in the cardiology office last month at which time he was found to be in atrial fibrillation.  A monitor was placed for 6 days which showed 100% atrial fibrillation.  A external cardioversion was recommended.  He is chronically anticoagulated with Eliquis without missed doses.  He has been on metoprolol and digoxin but no antiarrhythmic therapy.        Past medical and surgical history, social and family history reviewed in EMR.    REVIEW OF SYSTEMS:   H&P ROS reviewed and pertinent CV ROS as noted in HPI.         Vital Sign Min/Max for last 24 hours  Temp  Min: 97.8 °F (36.6 °C)  Max: 97.8 °F (36.6 °C)   BP  Min: 121/84  Max: 128/91   Pulse  Min: 74  Max: 75   Resp  Min: 18  Max: 18   SpO2  Min: 96 %  Max: 97 %   No data recorded    No intake or output data in the 24 hours ending 04/16/25 0957        Constitutional:       General: Awake.      Appearance: Healthy appearance.   Pulmonary:      Effort: Pulmonary effort is normal.      Breath sounds: Normal breath sounds.   Cardiovascular:      Normal rate. Irregularly irregular rhythm.      Murmurs: There is no murmur.   Pulses:     Intact distal pulses.   Edema:     Peripheral edema absent.   Skin:     General: Skin is warm and dry.   Neurological:      Mental Status: Alert.   Psychiatric:         Behavior: Behavior is cooperative.            Lab Review:   Labs reviewed in the electronic medical record.  Pertinent findings include:  Lab Results   Component Value Date    GLUCOSE 126 (H) 02/14/2020    BUN 24 (H) 02/14/2020    CREATININE 1.09 02/14/2020     02/14/2020    K 5.3 (H) 02/14/2020    CL 99 02/14/2020     CALCIUM 10.2 02/14/2020    PROTEINTOT 6.2 02/07/2020    ALBUMIN 3.90 02/07/2020    ALT 80 (H) 02/07/2020    AST 49 (H) 02/07/2020    ALKPHOS 74 02/07/2020    BILITOT 0.4 02/07/2020    GLOB 2.3 02/07/2020    AGRATIO 1.7 02/07/2020    BCR 22.0 02/14/2020    ANIONGAP 12.0 02/14/2020     Lab Results   Component Value Date    WBC 8.21 02/09/2020    HGB 14.6 02/09/2020    HCT 43.8 02/09/2020    .0 (H) 02/09/2020     02/09/2020     Lab Results   Component Value Date    CHOL 123 02/08/2020    TRIG 66 02/08/2020    HDL 49 02/08/2020    LDL 61 02/08/2020                Active Hospital Problems    Diagnosis     **Persistent atrial fibrillation      Detected at PCP office, 10/2024  [id number] (age, CHF, TIA/CVA)  Echo (11/19/2024): LVEF 32%.  Moderately dilated left atrium.  Moderate MR.  Moderate to severe TR.  RVSP 53 mmHg      Chronic systolic congestive heart failure      Echo (6/16/2020): EF 50%.  No significant valve  Echo (11/19/2024): EF 32%. The left atrial cavity is moderately dilated. Moderate MR. Moderate to severe tricuspid valve regurgitation. RVSP 53 mmHg. Aortic root measures 4.5 cm.       Hyperlipidemia LDL goal <70      Historically intolerant to multiple statin     Patient presents today to undergo external cardioversion for his persistent atrial fibrillation.  The risk and benefits of the procedure were discussed and the patient is agreeable to proceed.  He is chronically anticoagulated with Eliquis without missed doses.         Proceed with external cardioversion  Further recommendations to follow      DAVID Coronel

## 2025-04-21 LAB
QT INTERVAL: 432 MS
QTC INTERVAL: 466 MS

## 2025-04-28 ENCOUNTER — TELEPHONE (OUTPATIENT)
Dept: CARDIOLOGY | Facility: CLINIC | Age: 70
End: 2025-04-28

## 2025-04-28 DIAGNOSIS — I50.22 CHRONIC SYSTOLIC CONGESTIVE HEART FAILURE: Primary | ICD-10-CM

## 2025-04-28 NOTE — TELEPHONE ENCOUNTER
Caller: Hardeep James    Relationship: Self    Best call back number: [phone]     What is the best time to reach you: ANY    Who are you requesting to speak with (clinical staff, provider,  specific staff member): ANY      What was the call regarding: PATIENT CALLED TO INQUIRE IF THE OFFICE HAS RECEIVED HIS DISABILITY PAPERWORK AND IF IT HAS BEEN COMPLETED. PLEASE CONTACT PATIENT IN REGARDS TO THIS REQUEST.    Is it okay if the provider responds through [employer]: PLEASE CALL     Please address/ advise

## 2025-05-01 NOTE — TELEPHONE ENCOUNTER
Do not recall doing so.  I do not know if he would qualify for disability.   he needs a repeat echo to reassess his LVEF.  Also I would like him to start taking empagliflozin 10 mg daily.  I will send this in.    AV Gillespie MD [facility], [facility]  Interventional and General Cardiology

## 2025-05-01 NOTE — TELEPHONE ENCOUNTER
Patient's wife is calling about his long term disability paperwork. Did you agree to fill this out?

## 2025-05-02 NOTE — TELEPHONE ENCOUNTER
"Spoke with spouse. She reports the patient wants to be off work long term. She reports, \"he is not mentally right since his stroke.\" Advised her that we could not fill out LTD. She verbalized understanding.   "

## 2025-05-07 ENCOUNTER — TELEPHONE (OUTPATIENT)
Dept: CARDIOLOGY | Facility: CLINIC | Age: 70
End: 2025-05-07
Payer: COMMERCIAL

## 2025-05-07 NOTE — TELEPHONE ENCOUNTER
Caller: Hardeep James    Relationship: Self    Best call back number: [phone]     What is the best time to reach you: ANYTIME      Who are you requesting to speak with (clinical staff, provider,  specific staff member): CLINICAL     What was the call regarding:  PT IS WANTING TO KNOW IF HE WILL NEED A  AFTER HIS ECHO ON 05/14/25?     Is it okay if the provider responds through [employer]: PLEASE CALL PT, THANK YOU

## 2025-05-13 NOTE — PROGRESS NOTES
Cardiology Outpatient Visit      Identification: Hardeep James is a 69 y.o. male who resides in [place], [place]    Reason for visit:  Persistent atrial fibrillation      Subjective      Patient is a 68 y/o gentleman who returns today for f/u of his persistent atrial fibrillation, chronic systolic heart failure and cardiac risk factors. Patient underwent cardioversion  that restored NSR but had quick return of atrial fibrillation.  Rate control strategy has been chosen.  The patient had an echocardiogram last November that showed a newly reduced LVEF of 32%.  This is felt to be tachycardic induced from A-fib with RVR.  He underwent repeat limited echo today following past adjustments of his medications for reevaluation of his LVEF.  LVEF improved to currently 45%.  He denies chest pain, dyspnea, orthopnea, palpitations or syncope.  The patient reports he has has been off from work since last year.  He has decided now on early FCI    Review of Systems   Constitutional: Negative for malaise/fatigue.   Eyes:  Negative for vision loss in left eye and vision loss in right eye.   Cardiovascular:  Negative for chest pain, dyspnea on exertion, near-syncope, orthopnea, palpitations, paroxysmal nocturnal dyspnea and syncope.   Musculoskeletal:  Negative for myalgias.   Neurological:  Negative for brief paralysis, excessive daytime sleepiness, focal weakness, numbness, paresthesias and weakness.   All other systems reviewed and are negative.      Allergies   Allergen Reactions    Atorvastatin Myalgia    Pravastatin Mental Status Change         Current Outpatient Medications   Medication Instructions    acetaminophen (TYLENOL) 650 mg, Oral, Every 6 Hours PRN    apixaban (ELIQUIS) 5 mg, Oral, 2 Times Daily    digoxin (LANOXIN) 125 mcg, Oral, Daily    DULoxetine (CYMBALTA) 30 mg, Daily    empagliflozin (JARDIANCE) 10 mg, Oral, Daily    ezetimibe (ZETIA) 10 mg, Oral, Daily    furosemide (LASIX) 20 mg, Oral, Daily     "metoprolol succinate XL (TOPROL-XL) 100 mg, Oral, Daily    Multiple Vitamins-Minerals (CENTRUM ADULTS PO) 1 tablet, Daily    spironolactone (ALDACTONE) 25 mg, Oral, Daily    valsartan (DIOVAN) 80 mg, Oral, Daily    vitamin C (ASCORBIC ACID) 250 mg, Daily    vitamin E 100 Units, Daily         Objective     /78 (BP Location: Right arm, Patient Position: Sitting, Cuff Size: Adult)   Pulse 75   Ht 182.9 cm (72\")   Wt 97 kg (213 lb 12.8 oz)   SpO2 96%   BMI 29.00 kg/m²       Constitutional:       Appearance: Healthy appearance. Well-developed.   Eyes:      General: Lids are normal. No scleral icterus.     Conjunctiva/sclera: Conjunctivae normal.   HENT:      Head: Normocephalic and atraumatic.   Neck:      Thyroid: No thyromegaly.      Vascular: No carotid bruit or JVD.   Pulmonary:      Effort: Pulmonary effort is normal.      Breath sounds: Normal breath sounds. No wheezing. No rhonchi. No rales.   Cardiovascular:      Normal rate. Irregularly irregular rhythm.      Murmurs: There is no murmur.      No gallop.  No rub.   Pulses:     Intact distal pulses.   Edema:     Peripheral edema absent.   Abdominal:      General: There is no distension.      Palpations: Abdomen is soft. There is no abdominal mass.   Musculoskeletal:      Cervical back: Normal range of motion. Skin:     General: Skin is warm and dry.      Findings: No rash.   Neurological:      General: No focal deficit present.      Mental Status: Alert and oriented to person, place, and time.      Gait: Gait is intact.   Psychiatric:         Attention and Perception: Attention normal.         Mood and Affect: Mood normal.         Behavior: Behavior normal.         Result Review  (reviewed with patient):            Lab Results   Component Value Date    GLUCOSE 128 (H) 04/16/2025    BUN 22 04/16/2025    CREATININE 1.11 04/16/2025     04/16/2025    K 4.0 04/16/2025     04/16/2025    CALCIUM 9.7 04/16/2025    PROTEINTOT 6.2 02/07/2020    " ALBUMIN 3.90 02/07/2020    ALT 80 (H) 02/07/2020    AST 49 (H) 02/07/2020    ALKPHOS 74 02/07/2020    BILITOT 0.4 02/07/2020    GLOB 2.3 02/07/2020    AGRATIO 1.7 02/07/2020    BCR 19.8 04/16/2025    ANIONGAP 12.0 04/16/2025    EGFR 72.8 04/16/2025     Lab Results   Component Value Date    WBC 8.21 02/09/2020    HGB 14.6 02/09/2020    HCT 43.8 02/09/2020    .0 (H) 02/09/2020     02/09/2020     Lab Results   Component Value Date    CHOL 123 02/08/2020    TRIG 66 02/08/2020    HDL 49 02/08/2020    LDL 61 02/08/2020     Lab Results   Component Value Date    HGBA1C 5.70 (H) 02/08/2020             Assessment     Diagnoses and all orders for this visit:    1. Persistent atrial fibrillation (Primary)  Overview:  Detected at PCP office, 10/2024  [id number] 4 (age, CHF, TIA/CVA)  Echo (11/19/2024): LVEF 32%.  Moderately dilated left atrium.  Moderate MR.  Moderate to severe TR.  RVSP 53 mmHg  Holter monitor (3/2025): 100% atrial fibrillation with average heart rate 106 bpm.  Cardioversion with early return of atrial fibrillation, 4/16/2025    Assessment & Plan:  Recent cardioversion had early return of atrial fibrillation.  Rate control strategy  Continue Eliquis 5 mg twice daily for stroke prophylaxis  Continue metoprolol succinate 100 mg daily  Continue digoxin 125 mcg daily      2. Chronic systolic congestive heart failure  Overview:  Nuclear stress (3/20/2020): Normal perfusion.  LVEF 36%  Echo (6/16/2020): EF 50%.  No significant valve  Echo (11/19/2024): EF 32%. The left atrial cavity is moderately dilated. Moderate MR. Moderate to severe tricuspid valve regurgitation. RVSP 53 mmHg. Aortic root measures 4.5 cm.   Echo (5/14/2025): LVEF 45%    Assessment & Plan:  Echo cardiogram today showed improvement in LVEF from previously 32% to now 45%  Continue guideline directed medical therapy with Jardiance, metoprolol succinate, spironolactone, valsartan and Lasix          3. Hyperlipidemia LDL goal  <70  Overview:  Historically intolerant to multiple statin    Assessment & Plan:   Can continue Zetia 10 mg daily            Plan   Rate control strategy for atrial fibrillation.  Continue digoxin and metoprolol succinate  Continue Eliquis for stroke prophylaxis  LVEF has improved from previously 32% to now 45%  Continue guideline directed medical therapy      Follow-up   Return in about 6 months (around 11/14/2025), or if symptoms worsen or fail to improve, for Follow-up with Dr. Gillespie next visit.        Angela Vargas APRN  5/14/2025

## 2025-05-14 ENCOUNTER — OFFICE VISIT (OUTPATIENT)
Dept: CARDIOLOGY | Facility: CLINIC | Age: 70
End: 2025-05-14
Payer: COMMERCIAL

## 2025-05-14 ENCOUNTER — HOSPITAL ENCOUNTER (OUTPATIENT)
Dept: CARDIOLOGY | Facility: HOSPITAL | Age: 70
Discharge: HOME | End: 2025-05-14
Admitting: INTERNAL MEDICINE
Payer: COMMERCIAL

## 2025-05-14 VITALS
BODY MASS INDEX: 29.22 KG/M2 | DIASTOLIC BLOOD PRESSURE: 85 MMHG | SYSTOLIC BLOOD PRESSURE: 112 MMHG | HEIGHT: 73 IN | WEIGHT: 220.46 LBS

## 2025-05-14 VITALS
HEIGHT: 72 IN | SYSTOLIC BLOOD PRESSURE: 110 MMHG | WEIGHT: 213.8 LBS | HEART RATE: 75 BPM | OXYGEN SATURATION: 96 % | BODY MASS INDEX: 28.96 KG/M2 | DIASTOLIC BLOOD PRESSURE: 78 MMHG

## 2025-05-14 DIAGNOSIS — I50.22 CHRONIC SYSTOLIC CONGESTIVE HEART FAILURE: ICD-10-CM

## 2025-05-14 DIAGNOSIS — I48.19 PERSISTENT ATRIAL FIBRILLATION: Primary | ICD-10-CM

## 2025-05-14 DIAGNOSIS — E78.5 HYPERLIPIDEMIA LDL GOAL <70: ICD-10-CM

## 2025-05-14 LAB
ASCENDING AORTA: 4.3 CM
BH CV ECHO MEAS - AO ROOT DIAM: 4.5 CM
BH CV ECHO MEAS - IVS/LVPW: 1 CM
BH CV ECHO MEAS - IVSD: 1.3 CM
BH CV ECHO MEAS - LA DIMENSION: 4.5 CM
BH CV ECHO MEAS - LVIDD: 5 CM
BH CV ECHO MEAS - LVIDS: 4.1 CM
BH CV ECHO MEAS - LVOT AREA: 3.8 CM2
BH CV ECHO MEAS - LVOT DIAM: 2.2 CM
BH CV ECHO MEAS - LVPWD: 1.3 CM
BH CV VAS BP LEFT ARM: NORMAL MMHG
LEFT ATRIUM VOLUME INDEX: 59.6 ML/M2
LV EF 2D ECHO EST: 45 %
LV EF BIPLANE MOD: 37.9 %

## 2025-05-14 PROCEDURE — 99214 OFFICE O/P EST MOD 30 MIN: CPT | Performed by: NURSE PRACTITIONER

## 2025-05-14 PROCEDURE — 93325 DOPPLER ECHO COLOR FLOW MAPG: CPT

## 2025-05-14 PROCEDURE — 93321 DOPPLER ECHO F-UP/LMTD STD: CPT

## 2025-05-14 PROCEDURE — 93308 TTE F-UP OR LMTD: CPT

## 2025-05-14 PROCEDURE — 25010000002 SULFUR HEXAFLUORIDE MICROSPH 60.7-25 MG RECONSTITUTED SUSPENSION: Performed by: INTERNAL MEDICINE

## 2025-05-14 RX ADMIN — SULFUR HEXAFLUORIDE 2 ML: KIT at 12:24

## 2025-05-14 NOTE — ASSESSMENT & PLAN NOTE
Echo cardiogram today showed improvement in LVEF from previously 32% to now 45%  Continue guideline directed medical therapy with Jardiance, metoprolol succinate, spironolactone, valsartan and Lasix

## 2025-05-14 NOTE — ASSESSMENT & PLAN NOTE
Recent cardioversion had early return of atrial fibrillation.  Rate control strategy  Continue Eliquis 5 mg twice daily for stroke prophylaxis  Continue metoprolol succinate 100 mg daily  Continue digoxin 125 mcg daily

## 2025-05-23 ENCOUNTER — TELEPHONE (OUTPATIENT)
Dept: CARDIOLOGY | Facility: CLINIC | Age: 70
End: 2025-05-23
Payer: COMMERCIAL

## 2025-05-23 RX ORDER — ROSUVASTATIN CALCIUM 10 MG/1
10 TABLET, COATED ORAL DAILY
Qty: 30 TABLET | Refills: 0 | Status: SHIPPED | OUTPATIENT
Start: 2025-05-23

## 2025-05-23 NOTE — TELEPHONE ENCOUNTER
Spoke with the patient. He reports that he has had issues in the past with statins. I reviewed them with him and they are on his allergy/intolerance list. He is willing to attempt Crestor. He will let us know how he does with it. If he is able to tolerate, we will repeat labs.

## 2025-05-23 NOTE — TELEPHONE ENCOUNTER
----- Message from Angela Vargas sent at 5/16/2025 10:07 AM EDT -----  Regarding: Find out about his intolerance to statins.  There was to statins listed as allergy.  I was reviewing his cholesterol level and it is elevated.  Can you see if he would try different statin like Lipitor or Crestor?  I do not think he has established CAD  ----- Message -----  From: Nisha Medrano  Sent: 5/2/2025  11:25 AM EDT  To: DAVID Coronel

## 2025-06-25 RX ORDER — ROSUVASTATIN CALCIUM 10 MG/1
10 TABLET, COATED ORAL DAILY
Qty: 90 TABLET | Refills: 0 | Status: SHIPPED | OUTPATIENT
Start: 2025-06-25

## 2025-07-22 ENCOUNTER — TELEPHONE (OUTPATIENT)
Dept: CARDIOLOGY | Facility: CLINIC | Age: 70
End: 2025-07-22
Payer: COMMERCIAL

## 2025-07-22 NOTE — TELEPHONE ENCOUNTER
[employer] called regarding long term disability. Let patient know that we didn't fill out disability forms. He wife and him both verbalized understanding. Message sent to medical records to send any records that were needed.